# Patient Record
Sex: FEMALE | Race: BLACK OR AFRICAN AMERICAN | NOT HISPANIC OR LATINO | Employment: PART TIME | URBAN - METROPOLITAN AREA
[De-identification: names, ages, dates, MRNs, and addresses within clinical notes are randomized per-mention and may not be internally consistent; named-entity substitution may affect disease eponyms.]

---

## 2018-03-12 ENCOUNTER — OFFICE VISIT (OUTPATIENT)
Dept: OBSTETRICS AND GYNECOLOGY | Facility: CLINIC | Age: 23
End: 2018-03-12
Payer: COMMERCIAL

## 2018-03-12 VITALS
WEIGHT: 97.44 LBS | BODY MASS INDEX: 18.4 KG/M2 | HEIGHT: 61 IN | DIASTOLIC BLOOD PRESSURE: 62 MMHG | SYSTOLIC BLOOD PRESSURE: 98 MMHG

## 2018-03-12 DIAGNOSIS — N76.0 ACUTE VAGINITIS: Primary | ICD-10-CM

## 2018-03-12 LAB
CANDIDA RRNA VAG QL PROBE: NEGATIVE
G VAGINALIS RRNA GENITAL QL PROBE: POSITIVE
T VAGINALIS RRNA GENITAL QL PROBE: NEGATIVE

## 2018-03-12 PROCEDURE — 87491 CHLMYD TRACH DNA AMP PROBE: CPT

## 2018-03-12 PROCEDURE — 87480 CANDIDA DNA DIR PROBE: CPT

## 2018-03-12 PROCEDURE — 99999 PR PBB SHADOW E&M-NEW PATIENT-LVL III: CPT | Mod: PBBFAC,,, | Performed by: NURSE PRACTITIONER

## 2018-03-12 PROCEDURE — 99203 OFFICE O/P NEW LOW 30 MIN: CPT | Mod: SA,S$GLB,, | Performed by: NURSE PRACTITIONER

## 2018-03-12 RX ORDER — METRONIDAZOLE 7.5 MG/G
1 GEL VAGINAL DAILY
Qty: 70 G | Refills: 0 | Status: SHIPPED | OUTPATIENT
Start: 2018-03-12 | End: 2018-03-17

## 2018-03-12 NOTE — LETTER
March 12, 2018      [facility] [address]  [address], [address]  [facility] [address]  Phone: [phone]  Fax: [phone]       Patient: Jeri Galloway   YOB: 1995  Date of Visit: 03/12/2018    To Whom It May Concern:    Michele Galloway  was at [facility] on 03/12/2018. She may return to work/school on 3/13/2018 with no restrictions. If you have any questions or concerns, or if I can be of further assistance, please do not hesitate to contact me.    Sincerely,      KEREN Scott

## 2018-03-12 NOTE — PROGRESS NOTES
CC: Vaginal Discharge    Jeri Galloway is a 22 y.o. female  presents with complaint of vaginal discharge for 3 weeks.  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  She denies itching.  reports odor.  She states the discharge is clear.  Alleviating factors: none. Reports 1 new sexual partners.        ROS:  GENERAL: No fever, chills, fatigability or weight loss.  VULVAR: No pain, no lesions and no itching.  VAGINAL: No relaxation, no itching, + discharge, + odor. no abnormal bleeding and no lesions.  ABDOMEN: No abdominal pain. Denies nausea. Denies vomiting. No diarrhea. No constipation  BREAST: Denies pain. No lumps. No discharge.  URINARY: No incontinence, no nocturia, no frequency and no dysuria.  CARDIOVASCULAR: No chest pain. No shortness of breath. No leg cramps.  NEUROLOGICAL: No headaches. No vision changes.    PHYSICAL EXAM:  VULVA: normal appearing vulva with no masses, tenderness or lesions   VAGINA: normal appearing vagina with normal color and + thin discharge, no lesions   CERVIX: normal appearing cervix without discharge or lesions   UTERUS: uterus is normal size, shape, consistency and nontender   ADNEXA: normal adnexa in size, nontender and no masses    Diagnosis:  1. Acute vaginitis        Plan:   GCCT  Affirm  Metrogel    Orders Placed This Encounter    Vaginosis Screen by DNA Probe    C. trachomatis/N. gonorrhoeae by AMP DNA Cervix    metroNIDAZOLE (METROGEL VAGINAL) 0.75 % vaginal gel         Patient was counseled today on vaginitis prevention including :  a. avoiding feminine products such as deoderant soaps, body wash, bubble bath, douches, scented toilet paper, deoderant tampons or pads, feminine wipes, chronic pad use, etc.  b. avoiding other vulvovaginal irritants such as long hot baths, humidity, tight, synthetic clothing, chlorine and sitting around in wet bathing suits  c. wearing cotton underwear, avoiding thong underwear and no underwear to bed  d. taking showers instead of baths and use a  hair dryer on cool setting afterwards to dry  e. wearing cotton to exercise and shower immediately after exercise and change clothes  f. using polyurethane condoms without spermicide if sexually active and symptoms are triggered by intercourse    FOLLOW UP: PRN lack of improvement.    Kierra Rankin, FNP-C

## 2018-03-13 ENCOUNTER — TELEPHONE (OUTPATIENT)
Dept: OBSTETRICS AND GYNECOLOGY | Facility: CLINIC | Age: 23
End: 2018-03-13

## 2018-03-13 LAB
C TRACH DNA SPEC QL NAA+PROBE: NOT DETECTED
N GONORRHOEA DNA SPEC QL NAA+PROBE: NOT DETECTED

## 2018-03-13 NOTE — TELEPHONE ENCOUNTER
Called patient, no answer, could not leave message.         Please let patient know her vaginosis culture came back positive for bacterial vaginosis- which is not a STD. It is a bacteria that some times over grows in the vagina and replaces normal vaginal binu. Continue Metrogel as we discussed.Please notify pt her gonorrhea and chlamydia culture came back negative.

## 2018-04-10 ENCOUNTER — TELEPHONE (OUTPATIENT)
Dept: OBSTETRICS AND GYNECOLOGY | Facility: CLINIC | Age: 23
End: 2018-04-10

## 2018-04-10 NOTE — TELEPHONE ENCOUNTER
Returning patients call, no answer, left message to call office at [phone].          ----- Message from Sonali Carson sent at 4/10/2018  9:04 AM CDT -----  Contact: self  Pt returning a missed call from March, she can be reached at [phone].

## 2018-04-10 NOTE — TELEPHONE ENCOUNTER
Patient returning missed call form 3/13/2018.     Patient notified of results, verbalized understanding. Stated that she has taken the Rx back in March. Currently have no symptoms.               ----- Message from Sonali Carson sent at 4/10/2018 10:20 AM CDT -----  Contact: self  Pt returning a missed call, she can be reached at [phone].

## 2018-06-07 ENCOUNTER — OFFICE VISIT (OUTPATIENT)
Dept: OBSTETRICS AND GYNECOLOGY | Facility: CLINIC | Age: 23
End: 2018-06-07
Payer: COMMERCIAL

## 2018-06-07 VITALS
BODY MASS INDEX: 19.65 KG/M2 | HEIGHT: 61 IN | WEIGHT: 104.06 LBS | SYSTOLIC BLOOD PRESSURE: 100 MMHG | DIASTOLIC BLOOD PRESSURE: 60 MMHG

## 2018-06-07 DIAGNOSIS — R82.998 LEUKOCYTES IN URINE: ICD-10-CM

## 2018-06-07 DIAGNOSIS — R32 URINARY INCONTINENCE, UNSPECIFIED TYPE: ICD-10-CM

## 2018-06-07 DIAGNOSIS — Z72.51 UNPROTECTED SEXUAL INTERCOURSE: ICD-10-CM

## 2018-06-07 DIAGNOSIS — Z11.3 SCREEN FOR STD (SEXUALLY TRANSMITTED DISEASE): Primary | ICD-10-CM

## 2018-06-07 LAB
B-HCG UR QL: NEGATIVE
BILIRUB SERPL-MCNC: NEGATIVE MG/DL
BLOOD URINE, POC: NEGATIVE
CANDIDA RRNA VAG QL PROBE: NEGATIVE
COLOR, POC UA: YELLOW
CTP QC/QA: YES
G VAGINALIS RRNA GENITAL QL PROBE: POSITIVE
GLUCOSE UR QL STRIP: NORMAL
KETONES UR QL STRIP: NEGATIVE
LEUKOCYTE ESTERASE URINE, POC: ABNORMAL
NITRITE, POC UA: POSITIVE
PH, POC UA: 7
PROTEIN, POC: NEGATIVE
SPECIFIC GRAVITY, POC UA: 1
T VAGINALIS RRNA GENITAL QL PROBE: NEGATIVE
UROBILINOGEN, POC UA: NORMAL

## 2018-06-07 PROCEDURE — 87186 SC STD MICRODIL/AGAR DIL: CPT

## 2018-06-07 PROCEDURE — 87088 URINE BACTERIA CULTURE: CPT

## 2018-06-07 PROCEDURE — 87480 CANDIDA DNA DIR PROBE: CPT

## 2018-06-07 PROCEDURE — 87086 URINE CULTURE/COLONY COUNT: CPT

## 2018-06-07 PROCEDURE — 87510 GARDNER VAG DNA DIR PROBE: CPT

## 2018-06-07 PROCEDURE — 81002 URINALYSIS NONAUTO W/O SCOPE: CPT | Mod: S$GLB,,, | Performed by: NURSE PRACTITIONER

## 2018-06-07 PROCEDURE — 87491 CHLMYD TRACH DNA AMP PROBE: CPT

## 2018-06-07 PROCEDURE — 87077 CULTURE AEROBIC IDENTIFY: CPT

## 2018-06-07 PROCEDURE — 81025 URINE PREGNANCY TEST: CPT | Mod: S$GLB,,, | Performed by: NURSE PRACTITIONER

## 2018-06-07 PROCEDURE — 99213 OFFICE O/P EST LOW 20 MIN: CPT | Mod: 25,S$GLB,, | Performed by: NURSE PRACTITIONER

## 2018-06-07 PROCEDURE — 99999 PR PBB SHADOW E&M-EST. PATIENT-LVL III: CPT | Mod: PBBFAC,,,

## 2018-06-07 NOTE — PROGRESS NOTES
CC: possible UTI and STD check    HPI: Pt is a 23 y.o.  female who presents for what she thinks is a UTI. Reports a strong urine odor and random incontinence. States she gets a left sided cramp when she does not drink enough water. Does not think she is dehydrated today. Denies dysuria, hematuria, fever, chills, or urgency. Reports urinary frequency. She does have a 2 year old. Thinks the incontinence has been going on for about a month now. Random in nature. Not associated with coughing or sneezing. Does not hold her bladder for long periods of time.  She does want STD screening. She has been having unprotected intercourse x 3 weeks now. Requesting a UPT today. Thinks she may be allergic to latex condoms. Last few times she used them she had vaginal itching afterwards.       ROS:  GENERAL: Feeling well overall. Denies fever or chills.   SKIN: Denies rash or lesions.   HEAD: Denies head injury or headache.   NODES: Denies enlarged lymph nodes.   CHEST: Denies chest pain or shortness of breath.   CARDIOVASCULAR: Denies palpitations or left sided chest pain.   ABDOMEN: No abdominal pain, constipation, diarrhea, nausea, vomiting or rectal bleeding.   URINARY: No dysuria, hematuria, or burning on urination. Urinary incontinence and foul urine odor  REPRODUCTIVE: See HPI.   BREASTS: Denies pain, lumps, or nipple discharge.   HEMATOLOGIC: No easy bruisability or excessive bleeding.   MUSCULOSKELETAL: Denies joint pain or swelling.   NEUROLOGIC: Denies syncope or weakness.   PSYCHIATRIC: Denies depression, anxiety or mood swings.    PE:   APPEARANCE: Well nourished, well developed, Black or  female in no acute distress.  VULVA: No lesions. Normal external female genitalia.  URETHRAL MEATUS: Normal size and location, no lesions, no prolapse.  URETHRA: No masses, tenderness, or prolapse.  VAGINA: Moist. No lesions or lacerations noted. Large amount of thin milky discharge. No odor present.   CERVIX: No  lesions or discharge. No cervical motion tenderness.   UTERUS: Normal size, regular shape, mobile, non-tender.  ADNEXA: No tenderness. No fullness or masses palpated in the adnexal regions.   ANUS PERINEUM: Normal.      Diagnosis:  1. Screen for STD (sexually transmitted disease)    2. Unprotected sexual intercourse    3. Urinary incontinence, unspecified type    4. Leukocytes in urine        Plan:     Orders Placed This Encounter    C. trachomatis/N. gonorrhoeae by AMP DNA Cervix    Urine culture    Vaginosis Screen by DNA Probe    POCT URINE DIPSTICK WITHOUT MICROSCOPE     GCCT and affirm pending  Urology referral; discussed Kegel exercises  UPT negative-encouraged non latex condoms    Urine cx pending  -UTI precautions:  Wipe front to back and avoid constipation.  Avoid caffeine.  Drink lots of water  Void every 3-4 hrs.  Expel urine from vagina post void  No dryer sheets or harsh detergents with the undergarments  No bubble baths  Void before and after intercourse  Avoid hot tub use  Void soon after urge arises  Avoid tight fitting clothes and panty hose  Cranberry supplement      Follow-up with

## 2018-06-08 ENCOUNTER — TELEPHONE (OUTPATIENT)
Dept: OBSTETRICS AND GYNECOLOGY | Facility: CLINIC | Age: 23
End: 2018-06-08

## 2018-06-08 RX ORDER — METRONIDAZOLE 500 MG/1
500 TABLET ORAL EVERY 12 HOURS
Qty: 14 TABLET | Refills: 0 | Status: SHIPPED | OUTPATIENT
Start: 2018-06-08 | End: 2018-06-15

## 2018-06-08 NOTE — TELEPHONE ENCOUNTER
----- Message from Mimi Arnold NP sent at 6/8/2018 11:48 AM CDT -----  Regarding: BV rx to pharmacy  Please notify pt of BV-NOT an STD just an overgrowth of bacteria. Flagyl sent to pharmacy. Take for 7 days. No alcohol.

## 2018-06-08 NOTE — TELEPHONE ENCOUNTER
Informed pt of BV-NOT an STD just an overgrowth of bacteria. Flagyl sent to pharmacy. Take for 7 days. No alcohol. Pt verbalized understanding.

## 2018-06-09 LAB
C TRACH DNA SPEC QL NAA+PROBE: NOT DETECTED
N GONORRHOEA DNA SPEC QL NAA+PROBE: NOT DETECTED

## 2018-06-11 ENCOUNTER — OFFICE VISIT (OUTPATIENT)
Dept: UROLOGY | Facility: CLINIC | Age: 23
End: 2018-06-11
Attending: UROLOGY
Payer: COMMERCIAL

## 2018-06-11 VITALS
DIASTOLIC BLOOD PRESSURE: 65 MMHG | SYSTOLIC BLOOD PRESSURE: 98 MMHG | BODY MASS INDEX: 19.94 KG/M2 | HEART RATE: 68 BPM | WEIGHT: 105.63 LBS | HEIGHT: 61 IN

## 2018-06-11 DIAGNOSIS — R39.15 URGENCY OF URINATION: ICD-10-CM

## 2018-06-11 DIAGNOSIS — N30.00 ACUTE CYSTITIS WITHOUT HEMATURIA: ICD-10-CM

## 2018-06-11 DIAGNOSIS — R35.0 FREQUENCY OF URINATION: Primary | ICD-10-CM

## 2018-06-11 DIAGNOSIS — N39.41 URGE INCONTINENCE: ICD-10-CM

## 2018-06-11 LAB
BACTERIA UR CULT: NORMAL
BILIRUB SERPL-MCNC: ABNORMAL MG/DL
BLOOD URINE, POC: ABNORMAL
COLOR, POC UA: ABNORMAL
GLUCOSE UR QL STRIP: NORMAL
KETONES UR QL STRIP: ABNORMAL
LEUKOCYTE ESTERASE URINE, POC: ABNORMAL
NITRITE, POC UA: ABNORMAL
PH, POC UA: 5
PROTEIN, POC: ABNORMAL
SPECIFIC GRAVITY, POC UA: 1.01
UROBILINOGEN, POC UA: NORMAL

## 2018-06-11 PROCEDURE — 81002 URINALYSIS NONAUTO W/O SCOPE: CPT | Mod: S$GLB,,, | Performed by: UROLOGY

## 2018-06-11 PROCEDURE — 99243 OFF/OP CNSLTJ NEW/EST LOW 30: CPT | Mod: 25,S$GLB,, | Performed by: UROLOGY

## 2018-06-11 RX ORDER — NITROFURANTOIN 25; 75 MG/1; MG/1
100 CAPSULE ORAL 2 TIMES DAILY
Qty: 14 CAPSULE | Refills: 0 | Status: SHIPPED | OUTPATIENT
Start: 2018-06-11 | End: 2018-06-18

## 2018-06-11 NOTE — LETTER
June 11, 2018      Mimi Arnold NP  [address]  [address]  [facility] [address]           [facility]  [address]  [facility] [address]  Phone: [phone]  Fax: [phone]          Patient: Jeri Galloway   MR Number: [id number]   YOB: 1995   Date of Visit: 6/11/2018       Dear Mimi Arnold:    Thank you for referring Jeri Galloway to me for evaluation. Attached you will find relevant portions of my assessment and plan of care.    If you have questions, please do not hesitate to call me. I look forward to following Jeri Galloway along with you.    Sincerely,    Thomas Staples MD    Enclosure  CC:  No Recipients    If you would like to receive this communication electronically, please contact [email or web address] or [phone] to request more information on [facility] access.    For providers and/or their staff who would like to refer a patient to [facility], please contact us through our one-stop-shop provider referral line, [facility], at [phone].    If you feel you have received this communication in error or would no longer like to receive these types of communications, please e-mail [email or web address]

## 2018-06-11 NOTE — PROGRESS NOTES
"Subjective:      Jeri Galloway is a 23 y.o. female who was referred by Mimi Arnold NP for evaluation of irritative voiding symptoms.      She reports several months of intermittent symptoms including frequency, urgency, and urge incontinence. She reports several visits to urgent care and only diagnosed w/ BV (in March). She is not sure if she had UTI workup (none in record here).    She was seen in GYN walkin clinic last week. Tests again showed BV, which has been treated. She also had + urine culture, as of this AM, and antibiotics were prescribed.     The following portions of the patient's history were reviewed and updated as appropriate: allergies, current medications, past family history, past medical history, past social history, past surgical history and problem list.    Review of Systems  Constitutional: no fever or chills  ENT: no nasal congestion or sore throat  Respiratory: no cough or shortness of breath  Cardiovascular: no chest pain or palpitations  Gastrointestinal: no nausea or vomiting, tolerating diet  Genitourinary: as per HPI  Hematologic/Lymphatic: no easy bruising or lymphadenopathy  Musculoskeletal: no arthralgias or myalgias  Neurological: no seizures or tremors  Behavioral/Psych: no auditory or visual hallucinations     Objective:   Vital Signs:BP 98/65 (BP Location: Left arm, Patient Position: Sitting, BP Method: Medium (Automatic))   Pulse 68   Ht 5' 1" (1.549 m)   Wt 47.9 kg (105 lb 9.6 oz)   LMP 05/22/2018 (Approximate)   BMI 19.95 kg/m²     Physical Exam   General: alert and oriented, no acute distress  Head: normocephalic, atraumatic  Neck: supple, no lymphadenopathy, normal ROM, no masses  Respiratory: Symmetric expansion, non-labored breathing  Cardiovascular: regular rate and rhythm, nomal pulses, no peripheral edema  Skin: normal coloration and turgor, no rashes, no suspicious skin lesions noted  Neuro: alert and oriented x3, no gross deficits  Psych: normal " judgment and insight, normal mood/affect and non-anxious    Lab Review   Urinalysis demonstrates positive for nitrites, leukocytes    Urine culture reviewed in Epic    Assessment:     1. Frequency of urination    2. Urgency of urination    3. Urge incontinence    4. Acute cystitis without hematuria        Plan:   1. Symptoms all likely 2/2 UTI and should improve w/ abx  2. Discussed preventive measures including adequate hydration, cranberry juice, regular voiding, and voiding after intercourse.  3. Antibiotics as prescribed  4. FU PRN

## 2018-06-14 ENCOUNTER — TELEPHONE (OUTPATIENT)
Dept: UROLOGY | Facility: CLINIC | Age: 23
End: 2018-06-14

## 2018-06-14 ENCOUNTER — TELEPHONE (OUTPATIENT)
Dept: OBSTETRICS AND GYNECOLOGY | Facility: CLINIC | Age: 23
End: 2018-06-14

## 2018-06-14 NOTE — TELEPHONE ENCOUNTER
----- Message from Mell Ellington sent at 6/14/2018  3:08 PM CDT -----            Name of Who is Calling: selma      What is the request in detail: pt. Returning phone call.please call to discuss      Can the clinic reply by [facility]: no      What Number to Call Back if not in [facility]: [phone]

## 2018-06-14 NOTE — TELEPHONE ENCOUNTER
Left VM for pt to give the office a call.      Please notify pt of + UTI and antibiotic sent to her pharmacy today. She was also negative for gonorrhea and chlamydia.     -UTI precautions:   Wipe front to back and avoid constipation.   Avoid caffeine.   Drink lots of water   Void every 3-4 hrs.   Expel urine from vagina post void   No dryer sheets or harsh detergents with the undergarments   No bubble baths   Void before and after intercourse   Avoid hot tub use   Void soon after urge arises   Avoid tight fitting clothes and panty hose   Cranberry supplement

## 2018-06-14 NOTE — TELEPHONE ENCOUNTER
----- Message from Mimi Arnold NP sent at 6/11/2018  9:31 AM CDT -----  Regarding: UTI and rx to pharmacy  Please notify pt of + UTI and antibiotic sent to her pharmacy today. She was also negative for gonorrhea and chlamydia.     -UTI precautions:  Wipe front to back and avoid constipation.  Avoid caffeine.  Drink lots of water  Void every 3-4 hrs.  Expel urine from vagina post void  No dryer sheets or harsh detergents with the undergarments  No bubble baths  Void before and after intercourse  Avoid hot tub use  Void soon after urge arises  Avoid tight fitting clothes and panty hose  Cranberry supplement

## 2018-06-14 NOTE — TELEPHONE ENCOUNTER
Spoke with pt, informed her that it looks as if her GYN office was trying to contact her. Pt stated she will give that office a call.

## 2018-09-13 ENCOUNTER — OFFICE VISIT (OUTPATIENT)
Dept: OBSTETRICS AND GYNECOLOGY | Facility: CLINIC | Age: 23
End: 2018-09-13
Payer: COMMERCIAL

## 2018-09-13 VITALS
HEIGHT: 61 IN | WEIGHT: 104.75 LBS | DIASTOLIC BLOOD PRESSURE: 80 MMHG | SYSTOLIC BLOOD PRESSURE: 116 MMHG | BODY MASS INDEX: 19.78 KG/M2

## 2018-09-13 DIAGNOSIS — B37.9 CANDIDIASIS: ICD-10-CM

## 2018-09-13 DIAGNOSIS — Z30.013 ENCOUNTER FOR INITIAL PRESCRIPTION OF INJECTABLE CONTRACEPTIVE: Primary | ICD-10-CM

## 2018-09-13 DIAGNOSIS — R30.0 DYSURIA: ICD-10-CM

## 2018-09-13 LAB
B-HCG UR QL: NEGATIVE
BILIRUB SERPL-MCNC: ABNORMAL MG/DL
BLOOD URINE, POC: ABNORMAL
COLOR, POC UA: YELLOW
CTP QC/QA: YES
GLUCOSE UR QL STRIP: NORMAL
KETONES UR QL STRIP: ABNORMAL
LEUKOCYTE ESTERASE URINE, POC: ABNORMAL
NITRITE, POC UA: ABNORMAL
PH, POC UA: 5
PROTEIN, POC: ABNORMAL
SPECIFIC GRAVITY, POC UA: 1.01
UROBILINOGEN, POC UA: NORMAL

## 2018-09-13 PROCEDURE — 87077 CULTURE AEROBIC IDENTIFY: CPT

## 2018-09-13 PROCEDURE — 99214 OFFICE O/P EST MOD 30 MIN: CPT | Mod: 25,S$GLB,, | Performed by: OBSTETRICS & GYNECOLOGY

## 2018-09-13 PROCEDURE — 81025 URINE PREGNANCY TEST: CPT | Mod: S$GLB,,, | Performed by: OBSTETRICS & GYNECOLOGY

## 2018-09-13 PROCEDURE — 87086 URINE CULTURE/COLONY COUNT: CPT

## 2018-09-13 PROCEDURE — 81002 URINALYSIS NONAUTO W/O SCOPE: CPT | Mod: S$GLB,,, | Performed by: OBSTETRICS & GYNECOLOGY

## 2018-09-13 PROCEDURE — 87186 SC STD MICRODIL/AGAR DIL: CPT

## 2018-09-13 PROCEDURE — 99999 PR PBB SHADOW E&M-EST. PATIENT-LVL III: CPT | Mod: PBBFAC,,, | Performed by: OBSTETRICS & GYNECOLOGY

## 2018-09-13 PROCEDURE — 87088 URINE BACTERIA CULTURE: CPT

## 2018-09-13 RX ORDER — MEDROXYPROGESTERONE ACETATE 150 MG/ML
150 INJECTION, SUSPENSION INTRAMUSCULAR ONCE
Qty: 1 ML | Refills: 0 | Status: SHIPPED | OUTPATIENT
Start: 2018-09-13 | End: 2018-09-13

## 2018-09-13 RX ORDER — NITROFURANTOIN 25; 75 MG/1; MG/1
CAPSULE ORAL
Refills: 0 | COMMUNITY
Start: 2018-07-19 | End: 2018-09-13

## 2018-09-13 RX ORDER — MEDROXYPROGESTERONE ACETATE 150 MG/ML
150 INJECTION, SUSPENSION INTRAMUSCULAR
Qty: 1 ML | Refills: 0 | Status: SHIPPED | OUTPATIENT
Start: 2018-09-13 | End: 2018-11-12 | Stop reason: SDUPTHER

## 2018-09-13 RX ORDER — FLUCONAZOLE 150 MG/1
150 TABLET ORAL ONCE
Qty: 1 TABLET | Refills: 1 | Status: SHIPPED | OUTPATIENT
Start: 2018-09-13 | End: 2018-09-13

## 2018-09-13 NOTE — PROGRESS NOTES
History & Physical  Gynecology      SUBJECTIVE:     Chief Complaint: Vaginitis and Contraception       History of Present Illness:  Contraception Counseling  Patient presents for contraception counseling.  Pt reports that she has a latex allergy and have been using lambskin condoms for contraception.  However, notes that menses are heavy, and therefore would like to discuss starting something for contraception that will help with her menses.  Notes menses lasts 3-4 days.  Using both tampons and pads and often overflows both.  Reports passage of large clots.  Denies any lightheadedness/dizziness. Pt was on OCPs in high school but thinks that she wont remember to take a pill.  Has taken depo in the past and would like to try it again.  Got pregnant a month after starting depo.  Discussed with patient that needs to have another form of birth control for at least the first month after starting depo.  The patient is sexually active.  Pt also reports that she has a yeast infection.  Reports white vaginal discharge.  Does not want pelvic exam because she is on her period. Pertinent past medical history: none.  Pt recently with UTI treated with macrobid.  Reports that symptoms resolved.  Had hematuria and dysuria at that time.  Reports having some side pain today.  Denies fever/chills.        Review of patient's allergies indicates:   Allergen Reactions    Latex, natural rubber Hives       Past Medical History:   Diagnosis Date    Urinary tract infection      History reviewed. No pertinent surgical history.  OB History      Para Term  AB Living    1         1    SAB TAB Ectopic Multiple Live Births                     Family History   Problem Relation Age of Onset    Hypertension Father     Hypertension Mother      Social History     Tobacco Use    Smoking status: Never Smoker    Smokeless tobacco: Never Used   Substance Use Topics    Alcohol use: Yes     Comment: Occasionally    Drug use: No        Current Outpatient Medications   Medication Sig    fluconazole (DIFLUCAN) 150 MG Tab Take 1 tablet (150 mg total) by mouth once. REFILL AND RE-DOSE IF SYMPTOMS RECUR for 1 dose    medroxyPROGESTERone (DEPO-PROVERA) 150 mg/mL Syrg Inject 1 mL (150 mg total) into the muscle every 3 (three) months.     No current facility-administered medications for this visit.          Review of Systems:  Review of Systems   Constitutional: Negative for activity change, appetite change, chills, fatigue, fever and unexpected weight change.   Respiratory: Negative for cough, shortness of breath and wheezing.    Cardiovascular: Negative for chest pain and leg swelling.   Gastrointestinal: Negative for abdominal pain, constipation, diarrhea, nausea and vomiting.   Endocrine: Negative for hair loss and hot flashes.   Genitourinary: Positive for menorrhagia, menstrual problem, vaginal bleeding, vaginal discharge and dysmenorrhea. Negative for decreased libido, dyspareunia, dysuria, frequency, pelvic pain, urgency and vaginal pain.   Skin:  Negative for no acne and hair changes.   Neurological: Negative for headaches.   Psychiatric/Behavioral: Negative for sleep disturbance.   Breast: Negative for breast pain, nipple discharge and skin changes       OBJECTIVE:     Physical Exam:  Physical Exam   Constitutional: She is oriented to person, place, and time. She appears well-developed and well-nourished.   HENT:   Head: Normocephalic and atraumatic.   Eyes: Conjunctivae are normal. Right eye exhibits no discharge. Left eye exhibits no discharge. No scleral icterus.   Pulmonary/Chest: Effort normal. No stridor.   Abdominal: Soft. She exhibits no distension. There is no tenderness.   Genitourinary:   Genitourinary Comments: Pt declined pelvic exam.  No CVAT bilaterally.  No suprapubic TTP   Musculoskeletal: Normal range of motion.   Neurological: She is alert and oriented to person, place, and time.   Skin: Skin is warm and dry.    Psychiatric: She has a normal mood and affect. Her behavior is normal. Judgment and thought content normal.         ASSESSMENT:       ICD-10-CM ICD-9-CM    1. Encounter for initial prescription of injectable contraceptive Z30.013 V25.02 POCT urine pregnancy      medroxyPROGESTERone (DEPO-PROVERA) 150 mg/mL Syrg      DISCONTINUED: medroxyPROGESTERone (DEPO-PROVERA) 150 mg/mL Syrg   2. Candidiasis B37.9 112.9    3. Dysuria R30.0 788.1 POCT URINE DIPSTICK WITHOUT MICROSCOPE      Urine culture          Plan:      Jeri was seen today for vaginitis and contraception.    Diagnoses and all orders for this visit:    Encounter for initial prescription of injectable contraceptive  -All forms of contraception discussed in length with patient.  Discussed the pros and cons of OCPs, depo provera, IUD, nexplanon, the patch and NuvaRing.  Discussed that pt might have irregular menses with the initiation of any form of contraception.  Discussed that none of these protect against STDs.  Depending on which contraception pt chooses, discussed need for backup form of birth control for at least the first month after switching.  Pt voiced understanding and all questions were answered.  After our discussion, the patient has decided to try Depo provera  - First shot today.    -     POCT urine pregnancy- NEGATIVE  -     medroxyPROGESTERone (DEPO-PROVERA) 150 mg/mL Syrg; Inject 1 mL (150 mg total) into the muscle every 3 (three) months.    Candidiasis   - Vaginal discharge per patient.  Declined pelvic exam because on menses. Will give Rx for diflucan.  If no relief, pt needs to come back to clinic and have vaginal swab done.      -     fluconazole (DIFLUCAN) 150 MG Tab; Take 1 tablet (150 mg total) by mouth once. REFILL AND RE-DOSE IF SYMPTOMS RECUR for 1 dose    Dysuria  - Reports side pain.  Recent UTI treated with macrobid  - Udip today + leukocytes and protein.  UCx sent  -     POCT URINE DIPSTICK WITHOUT MICROSCOPE  -     Urine  culture        Orders Placed This Encounter   Procedures    Urine culture    POCT URINE DIPSTICK WITHOUT MICROSCOPE    POCT urine pregnancy       Follow-up in about 2 months (around 11/13/2018) for annual.     Counseling time: 30 minutes    Yolie Topete

## 2018-09-13 NOTE — LETTER
September 13, 2018    Jeri Galloway  [address] [place] [address]         [facility] [address]  [address] [address]  Phone: [phone] September 13, 2018     Patient: Jeri Galloway   YOB: 1995   Date of Visit: 9/13/2018       To Whom It May Concern:    It is my medical opinion that Jeri Galloway may return to work on 9-.    If you have any questions or concerns, please don't hesitate to call.    Sincerely,        Dr. Yolie Topete

## 2018-09-17 LAB — BACTERIA UR CULT: NORMAL

## 2018-09-18 ENCOUNTER — TELEPHONE (OUTPATIENT)
Dept: OBSTETRICS AND GYNECOLOGY | Facility: CLINIC | Age: 23
End: 2018-09-18

## 2018-09-18 RX ORDER — CIPROFLOXACIN 250 MG/1
250 TABLET, FILM COATED ORAL EVERY 12 HOURS
Qty: 6 TABLET | Refills: 0 | Status: SHIPPED | OUTPATIENT
Start: 2018-09-18 | End: 2018-09-21

## 2018-09-18 NOTE — TELEPHONE ENCOUNTER
----- Message from Yolie Topete MD sent at 9/18/2018  5:06 PM CDT -----  Please call patient and let her know she still has a UTI.  I'm going to send in an Rx for cipro to her pharmacy.  Have her pick this up.  Thanks!

## 2018-09-19 ENCOUNTER — OFFICE VISIT (OUTPATIENT)
Dept: PRIMARY CARE CLINIC | Facility: CLINIC | Age: 23
End: 2018-09-19
Payer: COMMERCIAL

## 2018-09-19 ENCOUNTER — NURSE TRIAGE (OUTPATIENT)
Dept: ADMINISTRATIVE | Facility: CLINIC | Age: 23
End: 2018-09-19

## 2018-09-19 ENCOUNTER — TELEPHONE (OUTPATIENT)
Dept: OBSTETRICS AND GYNECOLOGY | Facility: CLINIC | Age: 23
End: 2018-09-19

## 2018-09-19 VITALS
TEMPERATURE: 99 F | DIASTOLIC BLOOD PRESSURE: 64 MMHG | BODY MASS INDEX: 19.78 KG/M2 | SYSTOLIC BLOOD PRESSURE: 90 MMHG | HEIGHT: 61 IN | HEART RATE: 91 BPM | OXYGEN SATURATION: 99 % | WEIGHT: 104.75 LBS

## 2018-09-19 DIAGNOSIS — J02.9 SORE THROAT: Primary | ICD-10-CM

## 2018-09-19 DIAGNOSIS — J06.9 UPPER RESPIRATORY TRACT INFECTION, UNSPECIFIED TYPE: ICD-10-CM

## 2018-09-19 DIAGNOSIS — R05.9 COUGH: ICD-10-CM

## 2018-09-19 PROCEDURE — 99203 OFFICE O/P NEW LOW 30 MIN: CPT | Mod: S$GLB,,, | Performed by: NURSE PRACTITIONER

## 2018-09-19 PROCEDURE — 99999 PR PBB SHADOW E&M-EST. PATIENT-LVL IV: CPT | Mod: PBBFAC,,, | Performed by: NURSE PRACTITIONER

## 2018-09-19 RX ORDER — BENZONATATE 100 MG/1
100 CAPSULE ORAL 3 TIMES DAILY PRN
Qty: 30 CAPSULE | Refills: 0 | Status: SHIPPED | OUTPATIENT
Start: 2018-09-19 | End: 2018-09-29

## 2018-09-19 NOTE — PATIENT INSTRUCTIONS
1)Distilled salt water sinus rinses via neti pots or products such as Ayaz Med Sinus Rinse or Sinugator. Must wash container or device and use bottled water or distilled water. to avoid introducing infection.  2)Nasal Steroids (Nasocort, Rhinocort, Flonase)    - Can take Mucinex for cough  - Strongly encouraged adequate hydration and rest.  - Can take acetaminophen or ibuprofen as needed and as directed on bottle for any pain.   - Saline gargle and OTC throat lozenges as needed for sore throat.  - Avoid smoking and alcohol until symptoms improve.  - Hand washing for prevention.      - Return if symptoms worsen or do not improve in 1 week.

## 2018-09-19 NOTE — PROGRESS NOTES
Subjective:       Patient ID: Jeri Galloway is a 23 y.o. female with no significant PMH here for cold symptoms.      Chief Complaint: Establish Care; Cough (bloody spit, yellow mucus ); and Headache        HPI     Pt c/o 2 days of nasal congestion with pale yellow rhinorrhea. Cough is productive of blood tinged and pale yellow sputum. Very sore throat and HA. Denies fever/chills. Only taking cough drops. 2 year old son is sick with cold symptoms.   She has no appetite and has not eaten yet today. Also with mild diarrhea at onset of symptoms. Denies SOB/wheezing.    Review of Systems   Constitutional: Positive for appetite change (decreased) and fatigue. Negative for activity change, chills, diaphoresis and fever.   HENT: Positive for congestion, rhinorrhea and sore throat. Negative for ear pain, postnasal drip, sinus pressure, sinus pain and sneezing.    Eyes: Negative for visual disturbance.   Respiratory: Positive for cough. Negative for chest tightness, shortness of breath and wheezing.    Cardiovascular: Negative for chest pain.   Gastrointestinal: Positive for diarrhea. Negative for abdominal pain, constipation, nausea and vomiting.   Musculoskeletal: Negative for arthralgias and myalgias.   Neurological: Positive for headaches.   Hematological: Negative for adenopathy.       Past Medical History:   Diagnosis Date    Urinary tract infection      History reviewed. No pertinent surgical history.  Family History   Problem Relation Age of Onset    Hypertension Father     Hypertension Mother      Social History     Socioeconomic History    Marital status: Single     Spouse name: Not on file    Number of children: Not on file    Years of education: Not on file    Highest education level: Not on file   Social Needs    Financial resource strain: Not on file    Food insecurity - worry: Not on file    Food insecurity - inability: Not on file    Transportation needs - medical: Not on file     "Transportation needs - non-medical: Not on file   Occupational History    Not on file   Tobacco Use    Smoking status: Never Smoker    Smokeless tobacco: Never Used   Substance and Sexual Activity    Alcohol use: Yes     Comment: Occasionally    Drug use: No    Sexual activity: Yes     Partners: Male     Birth control/protection: None   Other Topics Concern    Not on file   Social History Narrative    Not on file         Objective:      Vitals:    09/19/18 1435   BP: 90/64   BP Location: Left arm   Patient Position: Sitting   Pulse: 91   Temp: 99.3 °F (37.4 °C)   SpO2: 99%   Weight: 47.5 kg (104 lb 11.5 oz)   Height: 5' 1" (1.549 m)      Physical Exam   Constitutional: She is oriented to person, place, and time. She appears well-developed and well-nourished. No distress.   HENT:   Head: Normocephalic and atraumatic.   Right Ear: Hearing, tympanic membrane, external ear and ear canal normal.   Left Ear: Hearing, tympanic membrane, external ear and ear canal normal.   Nose: Mucosal edema present.   Mouth/Throat: Posterior oropharyngeal erythema present. No oropharyngeal exudate.   Eyes: Conjunctivae and EOM are normal. Pupils are equal, round, and reactive to light.   Neck: Normal range of motion. Neck supple. No tracheal deviation present. No thyromegaly present.   Cardiovascular: Normal rate, regular rhythm, normal heart sounds and intact distal pulses. Exam reveals no gallop and no friction rub.   No murmur heard.  Pulmonary/Chest: Effort normal and breath sounds normal. No stridor. No respiratory distress. She has no wheezes. She has no rales. She exhibits no tenderness.   Abdominal: Soft. Bowel sounds are normal. She exhibits no distension and no mass. There is no tenderness. There is no guarding. No hernia.   Musculoskeletal: Normal range of motion. She exhibits no edema.   Lymphadenopathy:     She has no cervical adenopathy.   Neurological: She is alert and oriented to person, place, and time.   Skin: " Skin is warm and dry. She is not diaphoretic. No erythema.   Psychiatric: She has a normal mood and affect. Her behavior is normal. Thought content normal.   Vitals reviewed.      Assessment:       1. Sore throat    2. Upper respiratory tract infection, unspecified type    3. Cough        Plan:       Jeri was seen today for establish care, cough and headache.    Diagnoses and all orders for this visit:    Sore throat  -     POCT Rapid Strep A - neg    Upper respiratory tract infection, unspecified type-likely viral. Advised distilled saline nasal rinses followed by Flonase daily. Can try Mucinex as well and will do benzonatate for cough.  - Strongly encouraged adequate hydration and rest.  - Can take acetaminophen or ibuprofen as needed and as directed on bottle for any pain or headaches.  - Saline gargle and OTC throat lozenges as needed for sore throat.  - Avoid smoking and alcohol until symptoms improve.  - Hand washing for prevention.      - Return if symptoms worsen or do not improve in 1 week.          Cough    Other orders  -     benzonatate (TESSALON) 100 MG capsule; Take 1 capsule (100 mg total) by mouth 3 (three) times daily as needed for Cough.      RTC if symptoms worsen or fail to improve over the next several days, otherwise will return when well for annual physical exam.

## 2018-09-20 ENCOUNTER — TELEPHONE (OUTPATIENT)
Dept: OBSTETRICS AND GYNECOLOGY | Facility: CLINIC | Age: 23
End: 2018-09-20

## 2018-09-20 NOTE — TELEPHONE ENCOUNTER
----- Message from Trinidad Kearney MA sent at 9/19/2018  8:53 AM CDT -----  Please call patient and let her know she still has a UTI.  I'm going to send in an Rx for cipro to her pharmacy.  Have her pick this up.  Thanks!

## 2018-10-17 ENCOUNTER — OFFICE VISIT (OUTPATIENT)
Dept: OBSTETRICS AND GYNECOLOGY | Facility: CLINIC | Age: 23
End: 2018-10-17
Payer: COMMERCIAL

## 2018-10-17 VITALS
WEIGHT: 102.31 LBS | HEIGHT: 61 IN | SYSTOLIC BLOOD PRESSURE: 110 MMHG | BODY MASS INDEX: 19.32 KG/M2 | DIASTOLIC BLOOD PRESSURE: 60 MMHG

## 2018-10-17 DIAGNOSIS — B37.31 CANDIDA VAGINITIS: ICD-10-CM

## 2018-10-17 DIAGNOSIS — Z11.3 SCREEN FOR STD (SEXUALLY TRANSMITTED DISEASE): Primary | ICD-10-CM

## 2018-10-17 PROCEDURE — 99212 OFFICE O/P EST SF 10 MIN: CPT | Mod: S$GLB,,, | Performed by: OBSTETRICS & GYNECOLOGY

## 2018-10-17 PROCEDURE — 87491 CHLMYD TRACH DNA AMP PROBE: CPT

## 2018-10-17 PROCEDURE — 99999 PR PBB SHADOW E&M-EST. PATIENT-LVL III: CPT | Mod: PBBFAC,,, | Performed by: OBSTETRICS & GYNECOLOGY

## 2018-10-17 PROCEDURE — 87210 SMEAR WET MOUNT SALINE/INK: CPT | Mod: QW,S$GLB,, | Performed by: OBSTETRICS & GYNECOLOGY

## 2018-10-17 RX ORDER — FLUCONAZOLE 150 MG/1
150 TABLET ORAL DAILY
Qty: 1 TABLET | Refills: 0 | Status: SHIPPED | OUTPATIENT
Start: 2018-10-17 | End: 2018-10-18

## 2018-10-17 NOTE — PROGRESS NOTES
Subjective:       Patient ID: Jeri Galloway is a 23 y.o. female.    Chief Complaint:  Gynecologic Exam      History of Present Illness  HPI  Vaginal discharge with no itch or odor  History of vaginitis  Would like cervical DNA screen     Health Maintenance   Topic Date Due    Lipid Panel  1996    HPV Vaccines (1 - Female 3-dose series) 2007    TETANUS VACCINE  2014    Pap Smear  2017    Influenza Vaccine  2018    CHLAMYDIA SCREENING  2019     GYN & OB History  Patient's last menstrual period was 10/10/2018.   Date of Last Pap: No result found    OB History    Para Term  AB Living   1         1   SAB TAB Ectopic Multiple Live Births                  # Outcome Date GA Lbr Francois/2nd Weight Sex Delivery Anes PTL Lv   1                    Review of Systems  Review of Systems        Objective:   Physical Exam:               Genitourinary: There is no tenderness or lesion on the right labia. There is no rash, tenderness or lesion on the left labia. No bleeding in the vagina. No foreign body in the vagina. Vaginal discharge found. Labial bartholins normal.                   WET PREP;  Positive for budding yeast.  No lactobacilli noted, epith. Cells normal , no trich   Assessment:        1. Screen for STD (sexually transmitted disease)    2. Candida vaginitis                Plan:            Jeri was seen today for gynecologic exam.    Diagnoses and all orders for this visit:    Screen for STD (sexually transmitted disease)  -     C. trachomatis/N. gonorrhoeae by AMP DNA    Candida vaginitis  -     fluconazole (DIFLUCAN) 150 MG Tab; Take 1 tablet (150 mg total) by mouth once daily. for 1 day    Probiotic for 6 months

## 2018-10-18 LAB
C TRACH DNA SPEC QL NAA+PROBE: NOT DETECTED
N GONORRHOEA DNA SPEC QL NAA+PROBE: NOT DETECTED

## 2018-10-22 ENCOUNTER — TELEPHONE (OUTPATIENT)
Dept: OBSTETRICS AND GYNECOLOGY | Facility: CLINIC | Age: 23
End: 2018-10-22

## 2018-10-22 NOTE — TELEPHONE ENCOUNTER
----- Message from Junie Colladoin sent at 10/22/2018 11:13 AM CDT -----  Contact: Patient herself      Can the clinic reply in [facility]:  No      Please refill the medication(s) listed below. Please call the patient when the prescription(s) is ready for  at this phone number    Patient herself / # [phone]        Medication #1 fluconazole (DIFLUCAN) 150 MG Tab          Preferred Pharmacy: [facility] [address] [place] [address] [address] AT [facility] [address]      [phone] (Phone)  [phone] (Fax)

## 2018-10-24 ENCOUNTER — OFFICE VISIT (OUTPATIENT)
Dept: OBSTETRICS AND GYNECOLOGY | Facility: CLINIC | Age: 23
End: 2018-10-24
Payer: COMMERCIAL

## 2018-10-24 VITALS — WEIGHT: 102.06 LBS | HEIGHT: 61 IN | BODY MASS INDEX: 19.27 KG/M2

## 2018-10-24 DIAGNOSIS — N76.0 ACUTE VAGINITIS: Primary | ICD-10-CM

## 2018-10-24 PROCEDURE — 99213 OFFICE O/P EST LOW 20 MIN: CPT | Mod: S$GLB,,, | Performed by: NURSE PRACTITIONER

## 2018-10-24 PROCEDURE — 99999 PR PBB SHADOW E&M-EST. PATIENT-LVL III: CPT | Mod: PBBFAC,,, | Performed by: NURSE PRACTITIONER

## 2018-10-24 PROCEDURE — 87660 TRICHOMONAS VAGIN DIR PROBE: CPT

## 2018-10-24 NOTE — LETTER
October 24, 2018      [facility] [address]  [address]  [place] [facility] [address]  Phone: [phone]  Fax: [phone]       Patient: Jeri Galloway   YOB: 1995  Date of Visit: 10/24/2018    To Whom It May Concern:    Michele Galloway  was at [facility] on 10/24/2018. She may return to work/school on 10/25/2018 with no restrictions. If you have any questions or concerns, or if I can be of further assistance, please do not hesitate to contact me.    Sincerely,    Mimi Arnold NP.

## 2018-10-24 NOTE — PROGRESS NOTES
"Jeri Galloway is a 23 y.o. female  presents with complaint of vaginal discharge and odor. Hx of BV. Seen last week by Dr. Davenport, GCCT was negative at that visit. She is sexually active with one partner only. Partner is not circumcised. She is questioning if that is contributing to the problem. Uses unscented soap. Does not understand why she keeps getting this. Last documented affirm that was + for BV was in  with me.       Past Medical History:   Diagnosis Date    Urinary tract infection      No past surgical history on file.  Social History     Tobacco Use    Smoking status: Never Smoker    Smokeless tobacco: Never Used   Substance Use Topics    Alcohol use: Yes     Comment: Occasionally    Drug use: No     Family History   Problem Relation Age of Onset    Hypertension Father     Hypertension Mother      OB History    Para Term  AB Living   1         1   SAB TAB Ectopic Multiple Live Births                  # Outcome Date GA Lbr Francois/2nd Weight Sex Delivery Anes PTL Lv   1                    Ht 5' 1" (1.549 m)   Wt 46.3 kg (102 lb 1.2 oz)   LMP 10/10/2018 Comment: irregular cycles  BMI 19.29 kg/m²     ROS:  GENERAL: No fever, chills, fatigability or weight loss.  VULVAR: No pain, no lesions and no itching.  VAGINAL: No relaxation, no itching, + discharge, no abnormal bleeding and no lesions.  ABDOMEN: No abdominal pain. Denies nausea. Denies vomiting. No diarrhea. No constipation  BREAST: Denies pain. No lumps. No discharge.  URINARY: No incontinence, no nocturia, no frequency and no dysuria.  CARDIOVASCULAR: No chest pain. No shortness of breath. No leg cramps.  NEUROLOGICAL: No headaches. No vision changes.    PHYSICAL EXAM:  VULVA: normal appearing vulva with no masses, tenderness or lesions   VAGINA: normal appearing vagina with normal color. Large amount of thin white/grey discharge, appears to be BV, no lesions   CERVIX: normal appearing cervix without " discharge or lesions   UTERUS: uterus is normal size, shape, consistency and nontender   ADNEXA: normal adnexa in size, nontender and no masses    ASSESSMENT and PLAN:    ICD-10-CM ICD-9-CM    1. Acute vaginitis N76.0 616.10 Vaginosis Screen by DNA Probe     1. Affirm pending  2. D/w pt options of daily probiotics, using condoms, having partner clean prior to intercourse, daily lemon water, and boric acid suppositories.     Patient was counseled today on vaginitis prevention including :  a. avoiding feminine products such as deoderant soaps, body wash, bubble bath, douches, scented toilet paper, deoderant tampons or pads, feminine wipes, chronic pad use, etc.  b. avoiding other vulvovaginal irritants such as long hot baths, humidity, tight, synthetic clothing, chlorine and sitting around in wet bathing suits  c. wearing cotton underwear, avoiding thong underwear and no underwear to bed  d. taking showers instead of baths and use a hair dryer on cool setting afterwards to dry  e. wearing cotton to exercise and shower immediately after exercise and change clothes  f. using polyurethane condoms without spermicide if sexually active and symptoms are triggered by intercourse    FOLLOW UP: PRN lack of improvement.

## 2018-10-25 LAB
CANDIDA RRNA VAG QL PROBE: POSITIVE
G VAGINALIS RRNA GENITAL QL PROBE: POSITIVE
T VAGINALIS RRNA GENITAL QL PROBE: NEGATIVE

## 2018-10-26 ENCOUNTER — TELEPHONE (OUTPATIENT)
Dept: OBSTETRICS AND GYNECOLOGY | Facility: CLINIC | Age: 23
End: 2018-10-26

## 2018-10-26 RX ORDER — FLUCONAZOLE 200 MG/1
200 TABLET ORAL
Qty: 2 TABLET | Refills: 0 | Status: ON HOLD | OUTPATIENT
Start: 2018-10-26 | End: 2019-01-10 | Stop reason: HOSPADM

## 2018-10-26 RX ORDER — METRONIDAZOLE 500 MG/1
500 TABLET ORAL EVERY 12 HOURS
Qty: 14 TABLET | Refills: 0 | Status: SHIPPED | OUTPATIENT
Start: 2018-10-26 | End: 2018-11-02

## 2018-10-26 NOTE — TELEPHONE ENCOUNTER
Spoke with pt regarding affirm results. Rx diflucan and Flagyl to pharmacy. Pt will also try ph-D boric acid suppositories.

## 2018-11-12 ENCOUNTER — OFFICE VISIT (OUTPATIENT)
Dept: FAMILY MEDICINE | Facility: CLINIC | Age: 23
End: 2018-11-12
Payer: COMMERCIAL

## 2018-11-12 VITALS
WEIGHT: 102.31 LBS | SYSTOLIC BLOOD PRESSURE: 102 MMHG | HEART RATE: 98 BPM | HEIGHT: 61 IN | DIASTOLIC BLOOD PRESSURE: 78 MMHG | TEMPERATURE: 98 F | BODY MASS INDEX: 19.32 KG/M2 | OXYGEN SATURATION: 98 %

## 2018-11-12 DIAGNOSIS — R21 RASH AND NONSPECIFIC SKIN ERUPTION: Primary | ICD-10-CM

## 2018-11-12 DIAGNOSIS — Z30.013 ENCOUNTER FOR INITIAL PRESCRIPTION OF INJECTABLE CONTRACEPTIVE: ICD-10-CM

## 2018-11-12 PROCEDURE — 99212 OFFICE O/P EST SF 10 MIN: CPT | Mod: S$GLB,,, | Performed by: NURSE PRACTITIONER

## 2018-11-12 PROCEDURE — 99999 PR PBB SHADOW E&M-EST. PATIENT-LVL IV: CPT | Mod: PBBFAC,,, | Performed by: NURSE PRACTITIONER

## 2018-11-12 RX ORDER — CLOTRIMAZOLE AND BETAMETHASONE DIPROPIONATE 10; .64 MG/G; MG/G
CREAM TOPICAL 2 TIMES DAILY
Qty: 60 G | Refills: 0 | Status: ON HOLD | OUTPATIENT
Start: 2018-11-12 | End: 2019-01-10 | Stop reason: HOSPADM

## 2018-11-12 RX ORDER — MEDROXYPROGESTERONE ACETATE 150 MG/ML
150 INJECTION, SUSPENSION INTRAMUSCULAR
Qty: 1 ML | Refills: 4 | Status: SHIPPED | OUTPATIENT
Start: 2018-11-12 | End: 2019-05-27

## 2018-11-12 NOTE — PROGRESS NOTES
Subjective:       Patient ID: Jeri Galloway is a 23 y.o. female.    Chief Complaint: Rash (on bottom of foot bilaterally)    22yo female with no past medical history who presents to after hours clinic with complaints of rash to bilateral feet. Ms. Galloway reports a slight rash that has been present for some time but was not severe or concerning. She reports over the past few weeks the rash to her feet has worsened. The rash will hurt at times and itch at other times. She denies any sore throat, mouth sores or decreased appetite. 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 She has not applied anything topical or taken anything OTC. She does not sweat a lot and will wear open toed shoes on some days and closed toe shoes on other days. She does routinely get pedicures typically about once per month in attempts to improve the rash to no avail        Review of Systems   Skin: Positive for rash.       Objective:      Physical Exam   Constitutional: She is oriented to person, place, and time. She appears well-developed and well-nourished. No distress.   Neurological: She is alert and oriented to person, place, and time.   Skin: Skin is warm and dry.   Macules noted to bilateral plantar aspects of feet bilaterally; no erythema or drainage noted                    Assessment:       1. Rash and nonspecific skin eruption        Plan:       Rash noted to bilateral plantar aspects of feet  Concerned about combination of dermatitis vs fungal etiology  Will prescribe Lotrisone BID for now  Avoid soaking or pedicures for now  If symptoms persist and do not improve then will refer to Dermatology  Currently has no PCP. Instructed to call [facility] to establish care if rash persists

## 2018-11-28 ENCOUNTER — OFFICE VISIT (OUTPATIENT)
Dept: INTERNAL MEDICINE | Facility: CLINIC | Age: 23
End: 2018-11-28
Payer: COMMERCIAL

## 2018-11-28 VITALS
TEMPERATURE: 99 F | SYSTOLIC BLOOD PRESSURE: 90 MMHG | HEART RATE: 80 BPM | DIASTOLIC BLOOD PRESSURE: 60 MMHG | WEIGHT: 99.44 LBS | HEIGHT: 61 IN | BODY MASS INDEX: 18.78 KG/M2
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
## 2018-11-28 DIAGNOSIS — M54.2 NECK PAIN: ICD-10-CM

## 2018-11-28 DIAGNOSIS — R56.1 SEIZURE AFTER HEAD INJURY: ICD-10-CM

## 2018-11-28 PROCEDURE — 99999 PR PBB SHADOW E&M-EST. PATIENT-LVL IV: CPT | Mod: PBBFAC,,, | Performed by: INTERNAL MEDICINE

## 2018-11-28 PROCEDURE — 99214 OFFICE O/P EST MOD 30 MIN: CPT | Mod: S$GLB,,, | Performed by: INTERNAL MEDICINE

## 2018-11-28 RX ORDER — IBUPROFEN 600 MG/1
TABLET ORAL
Refills: 0 | COMMUNITY
Start: 2018-11-27 | End: 2018-11-28 | Stop reason: ALTCHOICE

## 2018-11-28 RX ORDER — METHOCARBAMOL 500 MG/1
500 TABLET, FILM COATED ORAL NIGHTLY PRN
Qty: 30 TABLET | Refills: 0 | Status: SHIPPED | OUTPATIENT
Start: 2018-11-28 | End: 2018-12-08

## 2018-11-28 RX ORDER — CYCLOBENZAPRINE HCL 10 MG
TABLET ORAL
Refills: 0 | COMMUNITY
Start: 2018-11-27 | End: 2018-11-28 | Stop reason: ALTCHOICE

## 2018-11-28 RX ORDER — DICLOFENAC SODIUM 50 MG/1
50 TABLET, DELAYED RELEASE ORAL 2 TIMES DAILY PRN
Qty: 30 TABLET | Refills: 0 | Status: ON HOLD | OUTPATIENT
Start: 2018-11-28 | End: 2019-01-10 | Stop reason: HOSPADM
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
## 2018-11-29 ENCOUNTER — TELEPHONE (OUTPATIENT)
Dept: NEUROLOGY | Facility: CLINIC | Age: 23
End: 2018-11-29

## 2018-11-29 ENCOUNTER — TELEPHONE (OUTPATIENT)
Dept: INTERNAL MEDICINE | Facility: CLINIC | Age: 23
End: 2018-11-29

## 2018-11-29 ENCOUNTER — PATIENT MESSAGE (OUTPATIENT)
Dept: INTERNAL MEDICINE | Facility: CLINIC | Age: 23
End: 2018-11-29

## 2018-11-29 NOTE — TELEPHONE ENCOUNTER
I called patient in regards to her appointment with Dr. Perales, there was no answer and I left a message.

## 2018-11-29 NOTE — TELEPHONE ENCOUNTER
Spoke with pt; states she missed her appt because she went to the wrong location. Advised pt neurology is booking up quickly, next availability is in mid December (per referral coordinator). Advised pt to contact insurance company for in network neurologists. Pt also request extension of doctors excuse; extension has been honored, return to work school on 12/3/2018; pt notified via portal

## 2018-11-29 NOTE — TELEPHONE ENCOUNTER
Sent letter to pt via portal letter of excused absence has been printed and filed on the 6th floor in the event she was not able to print it from the portal .

## 2018-11-29 NOTE — TELEPHONE ENCOUNTER
----- Message from Lorna Mei sent at 11/29/2018  1:37 PM CST -----  Hello, pt missed her appointment today. She is asking if she can be rescheduled to a sooner date then 12/17. Would this be possible?    Thanks!    Lorna
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
## 2018-11-29 NOTE — TELEPHONE ENCOUNTER
She was referred to neurology yesterday and an appointment should have been made.     She could see an outside Neurologist is available but she would need to contact her insurance company.    Did the Voltaren ordered yesterday help with the pain?    If pain is severe, would need to return to ER for further evaluation.

## 2019-01-04 ENCOUNTER — TELEPHONE (OUTPATIENT)
Dept: OBSTETRICS AND GYNECOLOGY | Facility: CLINIC | Age: 24
End: 2019-01-04

## 2019-01-04 ENCOUNTER — HOSPITAL ENCOUNTER (EMERGENCY)
Facility: OTHER | Age: 24
Discharge: PSYCHIATRIC OR SUBSTANCE USE FACILITY | End: 2019-01-04
Attending: EMERGENCY MEDICINE
Payer: COMMERCIAL

## 2019-01-04 ENCOUNTER — HOSPITAL ENCOUNTER (INPATIENT)
Facility: HOSPITAL | Age: 24
LOS: 6 days | Discharge: HOME | DRG: 885 | End: 2019-01-10
Attending: PSYCHIATRY & NEUROLOGY | Admitting: PSYCHIATRY & NEUROLOGY
Payer: COMMERCIAL

## 2019-01-04 VITALS
RESPIRATION RATE: 18 BRPM | BODY MASS INDEX: 18.88 KG/M2 | WEIGHT: 100 LBS | HEIGHT: 61 IN | SYSTOLIC BLOOD PRESSURE: 103 MMHG | DIASTOLIC BLOOD PRESSURE: 59 MMHG | HEART RATE: 87 BPM | TEMPERATURE: 99 F | OXYGEN SATURATION: 97 %

## 2019-01-04 DIAGNOSIS — R45.851 SUICIDE IDEATION: Primary | ICD-10-CM

## 2019-01-04 DIAGNOSIS — F32.89 OTHER DEPRESSION: ICD-10-CM

## 2019-01-04 DIAGNOSIS — F31.81 SEVERE DEPRESSED BIPOLAR II DISORDER WITHOUT PSYCHOTIC FEATURES: ICD-10-CM

## 2019-01-04 DIAGNOSIS — R45.851 SUICIDAL IDEATION: ICD-10-CM

## 2019-01-04 DIAGNOSIS — F33.2 SEVERE EPISODE OF RECURRENT MAJOR DEPRESSIVE DISORDER, WITHOUT PSYCHOTIC FEATURES: ICD-10-CM

## 2019-01-04 DIAGNOSIS — F31.81 BIPOLAR II DISORDER, SEVERE, DEPRESSED, WITH ANXIOUS DISTRESS, IN PARTIAL REMISSION: ICD-10-CM

## 2019-01-04 DIAGNOSIS — Z91.51 HISTORY OF SUICIDE ATTEMPT: ICD-10-CM

## 2019-01-04 LAB
ALBUMIN SERPL BCP-MCNC: 4 G/DL
ALP SERPL-CCNC: 55 U/L
ALT SERPL W/O P-5'-P-CCNC: 13 U/L
AMPHET+METHAMPHET UR QL: NEGATIVE
ANION GAP SERPL CALC-SCNC: 8 MMOL/L
APAP SERPL-MCNC: <3 UG/ML
AST SERPL-CCNC: 16 U/L
B-HCG UR QL: NEGATIVE
BARBITURATES UR QL SCN>200 NG/ML: NEGATIVE
BASOPHILS # BLD AUTO: 0.04 K/UL
BASOPHILS NFR BLD: 1 %
BENZODIAZ UR QL SCN>200 NG/ML: NEGATIVE
BILIRUB SERPL-MCNC: 0.3 MG/DL
BILIRUB UR QL STRIP: NEGATIVE
BUN SERPL-MCNC: 9 MG/DL
BZE UR QL SCN: NEGATIVE
CALCIUM SERPL-MCNC: 9.4 MG/DL
CANNABINOIDS UR QL SCN: NEGATIVE
CHLORIDE SERPL-SCNC: 106 MMOL/L
CLARITY UR: ABNORMAL
CO2 SERPL-SCNC: 24 MMOL/L
COLOR UR: YELLOW
CREAT SERPL-MCNC: 0.8 MG/DL
CREAT UR-MCNC: 157.2 MG/DL
CTP QC/QA: YES
DIFFERENTIAL METHOD: ABNORMAL
EOSINOPHIL # BLD AUTO: 0.2 K/UL
EOSINOPHIL NFR BLD: 5.8 %
ERYTHROCYTE [DISTWIDTH] IN BLOOD BY AUTOMATED COUNT: 11.9 %
EST. GFR  (AFRICAN AMERICAN): >60 ML/MIN/1.73 M^2
EST. GFR  (NON AFRICAN AMERICAN): >60 ML/MIN/1.73 M^2
ETHANOL SERPL-MCNC: <10 MG/DL
GLUCOSE SERPL-MCNC: 85 MG/DL
GLUCOSE UR QL STRIP: NEGATIVE
HCT VFR BLD AUTO: 36.6 %
HGB BLD-MCNC: 12.8 G/DL
HGB UR QL STRIP: NEGATIVE
KETONES UR QL STRIP: NEGATIVE
LEUKOCYTE ESTERASE UR QL STRIP: NEGATIVE
LYMPHOCYTES # BLD AUTO: 1.9 K/UL
LYMPHOCYTES NFR BLD: 45 %
MCH RBC QN AUTO: 30.5 PG
MCHC RBC AUTO-ENTMCNC: 35 G/DL
MCV RBC AUTO: 87 FL
METHADONE UR QL SCN>300 NG/ML: NEGATIVE
MONOCYTES # BLD AUTO: 0.3 K/UL
MONOCYTES NFR BLD: 6.5 %
NEUTROPHILS # BLD AUTO: 1.7 K/UL
NEUTROPHILS NFR BLD: 41.7 %
NITRITE UR QL STRIP: NEGATIVE
OPIATES UR QL SCN: NEGATIVE
PCP UR QL SCN>25 NG/ML: NEGATIVE
PH UR STRIP: 7 [PH] (ref 5–8)
PLATELET # BLD AUTO: 275 K/UL
PMV BLD AUTO: 9.9 FL
POTASSIUM SERPL-SCNC: 3.8 MMOL/L
PROT SERPL-MCNC: 7.4 G/DL
PROT UR QL STRIP: NEGATIVE
RBC # BLD AUTO: 4.19 M/UL
SODIUM SERPL-SCNC: 138 MMOL/L
SP GR UR STRIP: 1.01 (ref 1–1.03)
TOXICOLOGY INFORMATION: NORMAL
TSH SERPL DL<=0.005 MIU/L-ACNC: 1.2 UIU/ML
URN SPEC COLLECT METH UR: ABNORMAL
UROBILINOGEN UR STRIP-ACNC: 1 EU/DL
WBC # BLD AUTO: 4.13 K/UL

## 2019-01-04 PROCEDURE — 80307 DRUG TEST PRSMV CHEM ANLYZR: CPT

## 2019-01-04 PROCEDURE — 12400001 HC PSYCH SEMI-PRIVATE ROOM

## 2019-01-04 PROCEDURE — 80329 ANALGESICS NON-OPIOID 1 OR 2: CPT

## 2019-01-04 PROCEDURE — 81025 URINE PREGNANCY TEST: CPT | Performed by: EMERGENCY MEDICINE

## 2019-01-04 PROCEDURE — 80053 COMPREHEN METABOLIC PANEL: CPT

## 2019-01-04 PROCEDURE — 99285 EMERGENCY DEPT VISIT HI MDM: CPT

## 2019-01-04 PROCEDURE — 80320 DRUG SCREEN QUANTALCOHOLS: CPT

## 2019-01-04 PROCEDURE — 85025 COMPLETE CBC W/AUTO DIFF WBC: CPT

## 2019-01-04 PROCEDURE — 81003 URINALYSIS AUTO W/O SCOPE: CPT | Mod: 59

## 2019-01-04 PROCEDURE — 84443 ASSAY THYROID STIM HORMONE: CPT

## 2019-01-04 RX ORDER — LOPERAMIDE HYDROCHLORIDE 2 MG/1
2 CAPSULE ORAL 4 TIMES DAILY PRN
Status: DISCONTINUED | OUTPATIENT
Start: 2019-01-04 | End: 2019-01-10 | Stop reason: HOSPADM

## 2019-01-04 RX ORDER — CALCIUM CARBONATE 200(500)MG
1000 TABLET,CHEWABLE ORAL 3 TIMES DAILY PRN
Status: DISCONTINUED | OUTPATIENT
Start: 2019-01-04 | End: 2019-01-10 | Stop reason: HOSPADM

## 2019-01-04 RX ORDER — HYDROXYZINE PAMOATE 50 MG/1
50 CAPSULE ORAL NIGHTLY PRN
Status: DISCONTINUED | OUTPATIENT
Start: 2019-01-04 | End: 2019-01-10 | Stop reason: HOSPADM

## 2019-01-04 RX ORDER — HYDROXYZINE PAMOATE 25 MG/1
25 CAPSULE ORAL EVERY 6 HOURS PRN
Status: DISCONTINUED | OUTPATIENT
Start: 2019-01-04 | End: 2019-01-10 | Stop reason: HOSPADM

## 2019-01-04 RX ORDER — ACETAMINOPHEN 325 MG/1
650 TABLET ORAL EVERY 6 HOURS PRN
Status: DISCONTINUED | OUTPATIENT
Start: 2019-01-04 | End: 2019-01-10 | Stop reason: HOSPADM

## 2019-01-04 RX ORDER — OLANZAPINE 10 MG/2ML
10 INJECTION, POWDER, FOR SOLUTION INTRAMUSCULAR ONCE AS NEEDED
Status: DISCONTINUED | OUTPATIENT
Start: 2019-01-04 | End: 2019-01-10 | Stop reason: HOSPADM

## 2019-01-04 RX ORDER — OLANZAPINE 10 MG/1
10 TABLET ORAL EVERY 8 HOURS PRN
Status: DISCONTINUED | OUTPATIENT
Start: 2019-01-04 | End: 2019-01-10 | Stop reason: HOSPADM

## 2019-01-04 NOTE — ED NOTES
Pt lying in bed with eyes closed, respirations even and unlabored, appears in no acute distress. Remains dressed in paper scrubs. Raffaele YAO remains in direct obs of pt.

## 2019-01-04 NOTE — ED TRIAGE NOTES
Pt reports hx of postpartum depression 3yrs ago, denies compliance w/ medications. Pt reports living busy lifestyle w/ school, working 2 jobs, and raising a 3yr old. Pt reports taking multiple previously prescribed muscle relaxer's yesterday in an attempt to kill herself. Pt states she called the mental health crisis line, which she states was helpful, and prompted her to seek help today. Reports 2 failed attempts at establishing a PCP this AM, which lead to today's ED visit. Pt is AAO x 3, answers questions appropriately, calm and cooperative.

## 2019-01-04 NOTE — TELEPHONE ENCOUNTER
----- Message from Estelle Patel sent at 1/4/2019 10:38 AM CST -----  Contact: Pt   Name of Who is Calling: SUMEET ARRIOLA [[id number]]      What is the request in detail: Patient is requesting a call from staff in regards to getting her anti depression medication renewed. Please contact to further discuss and advise      Can the clinic reply by [facility]: No       [facility] Back [facility]: [phone]

## 2019-01-04 NOTE — ED NOTES
Meal tray ordered. Pt resting comfortably in bed. Elnathure at bedside in direct line of site. Pt updated on plan of care and transfer

## 2019-01-04 NOTE — ED NOTES
Admit packet faxed to [facility], [facility], [facility], and [facility]. Awaiting response.

## 2019-01-04 NOTE — ED NOTES
Notified ED nurse of placement at main campus. CPT will notify RN when they can call report to [facility].

## 2019-01-04 NOTE — LETTER
[address]  [facility] [address]  Phone: [phone]  Fax: [phone]

## 2019-01-04 NOTE — ED NOTES
Belongings obtained, labeled and given to security:   Black tennis shoes  Black jeans  Colorful t-shirt

## 2019-01-04 NOTE — LETTER
January 10, 2019         [address]  [place] [address]  Phone: [phone]  Fax: [phone]       Patient: Jeri Galloway   YOB: 1995  Date of stay:01/04/2019-  01/10/2019    To Whom It May Concern:    Michele Galloway  was under my care at [facility] from 01/04/2019 - 01/10/2019. She may return to work/school on Monday 1/14/2019 with no restrictions.  If you have any questions or concerns, or if I can be of further assistance, please do not hesitate to contact me.    Sincerely,    Maddy Stoll MD
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
## 2019-01-04 NOTE — LETTER
January 10, 2019         [address]  [place] [address]  Phone: [phone]  Fax: [phone]       Patient: Jeri Galloway   YOB: 1995  Date of stay:01/04/2019-  01/10/2019    To Whom It May Concern:    Michele Galloway  was under my care at [facility] from 01/04/2019 - 01/10/2019. She may return to work/school on Monday 1/14/2019 with no restrictions.  If you have any questions or concerns, or if I can be of further assistance, please do not hesitate to contact me.    Sincerely,    Maddy Stoll MD

## 2019-01-04 NOTE — ED NOTES
Pt has acceptance at [facility] ([address] [place] [address]).  The number to call report [phone].

## 2019-01-05 LAB
FOLATE SERPL-MCNC: 8 NG/ML
RPR SER QL: NORMAL
T3 SERPL-MCNC: 88 NG/DL
T4 FREE SERPL-MCNC: 0.95 NG/DL
TSH SERPL DL<=0.005 MIU/L-ACNC: 0.61 UIU/ML
VIT B12 SERPL-MCNC: 647 PG/ML

## 2019-01-05 PROCEDURE — 86592 SYPHILIS TEST NON-TREP QUAL: CPT

## 2019-01-05 PROCEDURE — 82607 VITAMIN B-12: CPT

## 2019-01-05 PROCEDURE — 84443 ASSAY THYROID STIM HORMONE: CPT

## 2019-01-05 PROCEDURE — 84480 ASSAY TRIIODOTHYRONINE (T3): CPT

## 2019-01-05 PROCEDURE — 25000003 PHARM REV CODE 250: Performed by: STUDENT IN AN ORGANIZED HEALTH CARE EDUCATION/TRAINING PROGRAM

## 2019-01-05 PROCEDURE — 82746 ASSAY OF FOLIC ACID SERUM: CPT

## 2019-01-05 PROCEDURE — 12400001 HC PSYCH SEMI-PRIVATE ROOM

## 2019-01-05 PROCEDURE — 84439 ASSAY OF FREE THYROXINE: CPT

## 2019-01-05 PROCEDURE — 99222 PR INITIAL HOSPITAL CARE,LEVL II: ICD-10-PCS | Mod: ,,, | Performed by: PSYCHIATRY & NEUROLOGY

## 2019-01-05 PROCEDURE — 36415 COLL VENOUS BLD VENIPUNCTURE: CPT

## 2019-01-05 PROCEDURE — 99222 1ST HOSP IP/OBS MODERATE 55: CPT | Mod: ,,, | Performed by: PSYCHIATRY & NEUROLOGY

## 2019-01-05 RX ORDER — SERTRALINE HYDROCHLORIDE 50 MG/1
50 TABLET, FILM COATED ORAL NIGHTLY
Status: DISCONTINUED | OUTPATIENT
Start: 2019-01-05 | End: 2019-01-06

## 2019-01-05 RX ADMIN — SERTRALINE HYDROCHLORIDE 50 MG: 50 TABLET ORAL at 08:01
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
## 2019-01-05 NOTE — ED NOTES
Pt resting in bed comfortably at this time eyes closed and respirations even and unlabored, bathroom needs addressed and comfort situated. PEC in place and one to one direct observation maintained with Jessica HINDS at this time. Pt medically cleared and awaiting transport . Will be available for pt's needs and will continue to monitor pt.

## 2019-01-05 NOTE — ED NOTES
Pt lying in bed with eyes closed, respirations even and unlabored, appears in no acute distress. Bed in lowest, locked position, side rails up x 2. Sitter Bertha T, in direct obs of pt.

## 2019-01-05 NOTE — PLAN OF CARE
01/05/19 1100   [facility] Group Therapy   Group Name Medication   Participation Level Appropriate;Attentive   Participation Quality Cooperative   Insight/Motivation Improved   Affect/Mood Display Appropriate   Cognition Alert

## 2019-01-05 NOTE — ED NOTES
Pt care assumed at this time.   Pt resting in bed comfortably at this time eyes closed and respirations even and unlabored, bathroom needs addressed and comfort situated. PEC in place and one to one direct observation maintained with Jessica HINDS at this time. Pt medically cleared and awaiting transport . Will be available for pt's needs and will continue to monitor pt.

## 2019-01-05 NOTE — ASSESSMENT & PLAN NOTE
Patient with worsening depression for the past couple months, resulting in 4 suicide attempts via overdose and cutting. Attributes depression due to stressors with school, family expectations, and work. Previous trial of Zoloft but was not on medication long enough to assess its efficacy. Reports depressive symptoms of increased sleep, poor appetite, poor concentration, guilt, hopelessness.     · Defer scheduled medications to inpatient team  Zyprexa 10mg PO/IM Q8H PRN for non-redirectable agitation  Vistaril 50mg PO Q6H PRN for anxiety/insomnia
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
## 2019-01-05 NOTE — PROGRESS NOTES
01/05/19 0900 01/05/19 1000 01/05/19 1100   [facility] Group Therapy   Group Name Community Reintegration Reminiscing Medication   Specific Interventions Current Events Other (see comments)  (emotion can) --    Participation Level None None Appropriate;Attentive   Participation Quality Withdrawn Withdrawn Cooperative   Insight/Motivation --  --  Improved   Affect/Mood Display --  --  Appropriate   Cognition --  --  Alert       01/05/19 1400   [facility] Group Therapy   Group Name Therapeutic Recreation   Specific Interventions Skilled Activity Mild Exercises   Participation Level None   Participation Quality Withdrawn   Insight/Motivation --    Affect/Mood Display --    Cognition --

## 2019-01-05 NOTE — PLAN OF CARE
Problem: Adult Inpatient Plan of Care  Goal: Plan of Care Review  Outcome: Ongoing (interventions implemented as appropriate)  Patient alert and oriented. Patient calm and cooperative. Patient safety maintained. Will continue to monitor.
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
## 2019-01-05 NOTE — SUBJECTIVE & OBJECTIVE
Patient History           Medical as of 1/4/2019     Past Medical History     Diagnosis Date Comments Source    Depression -- -- Provider    Migraine -- complex Provider    Psychiatric problem -- -- Provider    Urinary tract infection -- -- Provider          Pertinent Negatives     Diagnosis Date Noted Comments Source    Allergy 06/11/2018 -- Provider    Asthma 06/11/2018 -- Provider    Diabetes mellitus 06/11/2018 -- Provider    Disorder of kidney and ureter 06/11/2018 -- Provider    History of psychiatric hospitalization 01/04/2019 -- Provider    HIV infection 06/11/2018 -- Provider    Hx of psychiatric care 01/04/2019 -- Provider    Hypertension 06/11/2018 -- Provider    Kidney stone 06/11/2018 -- Provider    STD (sexually transmitted disease) 06/11/2018 -- Provider    Therapy 01/04/2019 -- Provider                  Surgical as of 1/4/2019     Past Surgical History     Procedure Laterality Date Comments Source    none [Other] -- -- -- Provider                  Family as of 1/4/2019     Problem Relation Name Age of Onset Comments Source    Hypertension Father -- -- -- Provider    Hypertension Mother -- -- -- Provider    Diabetes Maternal Grandmother -- -- -- Provider    Cancer Paternal Grandmother -- -- -- Provider            Tobacco Use as of 1/4/2019     Smoking Status Smoking Start Date Smoking Quit Date Packs/Day Years Used    Never Smoker -- -- -- --    Types Comments Smokeless Tobacco Status Smokeless Tobacco Quit Date Source    -- -- Never Used -- Provider            Alcohol Use as of 1/4/2019     Alcohol Use Drinks/Week Alcohol/Week Comments Source    Yes -- -- Occasionally Provider    Frequency Standard Drinks Binge Drinking Source      -- -- -- Provider             Drug Use as of 1/4/2019     Drug Use Types Frequency Comments Source    No -- -- -- Provider            Sexual Activity as of 1/4/2019     Sexually Active Birth Control Partners Comments Source    Yes Injection Male -- Provider             Activities of Daily Living as of 1/4/2019     Activities of Daily Living Question Response Comments Source    Patient feels they ought to cut down on drinking/drug use Not Asked -- Provider    Patient annoyed by others criticizing their drinking/drug use Not Asked -- Provider    Patient has felt bad or guilty about drinking/drug use Not Asked -- Provider    Patient has had a drink/used drugs as an eye opener in the AM Not Asked -- Provider            Social Documentation as of 1/4/2019    None           Occupational as of 1/4/2019    None           Socioeconomic as of 1/4/2019     Marital Status Spouse Name Number of Children Years Education Education Level Preferred Language Ethnicity Race Source    Single -- -- -- -- English /Black Black or  Provider    Financial Resource Strain Food Insecurity: Worry Food Insecurity: Inability Transportation Needs: Medical Transportation Needs: Non-medical       -- -- -- -- --             Pertinent History     Question Response Comments    Lives with family --    Place in Birth Order -- --    Lives in home --    Number of Siblings -- --    Raised by -- --    Legal Involvement -- --    Childhood Trauma -- --    Criminal History of -- --    Financial Status -- --    Highest Level of Education -- --    Does patient have access to a firearm? -- --     Service -- --    Primary Leisure Activity -- --    Spirituality -- --        Past Medical History:   Diagnosis Date    Depression     Migraine     complex    Psychiatric problem     Urinary tract infection      Past Surgical History:   Procedure Laterality Date    none       Family History     Problem Relation (Age of Onset)    Cancer Paternal Grandmother    Diabetes Maternal Grandmother    Hypertension Father, Mother        Tobacco Use    Smoking status: Never Smoker    Smokeless tobacco: Never Used   Substance and Sexual Activity    Alcohol use: Yes     Comment: Occasionally    Drug  "use: No    Sexual activity: Yes     Partners: Male     Birth control/protection: Injection     Review of patient's allergies indicates:   Allergen Reactions    Latex, natural rubber Hives       No current facility-administered medications on file prior to encounter.      Current Outpatient Medications on File Prior to Encounter   Medication Sig    clotrimazole-betamethasone 1-0.05% (LOTRISONE) cream Apply topically 2 (two) times daily.    diclofenac (VOLTAREN) 50 MG EC tablet Take 1 tablet (50 mg total) by mouth 2 (two) times daily as needed.    fluconazole (DIFLUCAN) 200 MG Tab Take 1 tablet (200 mg total) by mouth every 72 hours as needed.    medroxyPROGESTERone (DEPO-PROVERA) 150 mg/mL Syrg Inject 1 mL (150 mg total) into the muscle every 3 (three) months.     Psychotherapeutics (From admission, onward)    None        Review of Systems   All other systems reviewed and are negative.    Strengths and Liabilities: Strength: Patient is physically healthy., Strength: Patient has positive support network., Liability: Patient is impulsive., Liability: Patient lacks coping skills.    Objective:     Vital Signs (Most Recent):  Temp: 98.2 °F (36.8 °C) (01/04/19 2130)  Pulse: (!) 55 (01/04/19 2130)  Resp: 16 (01/04/19 2130)  BP: (!) 104/58 (01/04/19 2130) Vital Signs (24h Range):  Temp:  [98 °F (36.7 °C)-98.5 °F (36.9 °C)] 98.2 °F (36.8 °C)  Pulse:  [55-87] 55  Resp:  [16-18] 16  SpO2:  [97 %-100 %] 97 %  BP: (102-140)/(55-87) 104/58     Height: 5' 1" (154.9 cm)  Weight: 44.2 kg (97 lb 7.1 oz)  Body mass index is 18.41 kg/m².    No intake or output data in the 24 hours ending 01/04/19 2146    Physical Exam   Constitutional: She is oriented to person, place, and time. She appears well-developed and well-nourished.   HENT:   Head: Normocephalic and atraumatic.   Eyes: EOM are normal. Pupils are equal, round, and reactive to light.   Cardiovascular: Normal rate, regular rhythm, normal heart sounds and intact distal " pulses.   Pulmonary/Chest: Effort normal and breath sounds normal.   Abdominal: Soft. Bowel sounds are normal. She exhibits no distension. There is no tenderness.   Neurological: She is oriented to person, place, and time. Gait normal.   Skin: Skin is warm and dry.   Psychiatric:   Mental Status Exam:  Appearance: unremarkable, age appropriate  Behavior/Cooperation: normal, friendly and cooperative, eye contact normal  Speech: normal tone, normal rate, normal pitch, normal volume  Mood: depressed  Affect: normal  Thought Process: normal and logical  Thought Content: normal, no suicidality, no homicidality, delusions, or paranoia   Orientation: grossly intact  Memory: Grossly intact  Attention Span/Concentration: Normal  Insight: fair  Judgment: limited       NEUROLOGICAL EXAMINATION:     MENTAL STATUS   Oriented to person, place, and time.     CRANIAL NERVES     CN II   Visual fields full to confrontation.     CN III, IV, VI   Pupils are equal, round, and reactive to light.  Extraocular motions are normal.     CN V   Facial sensation intact.     CN VII   Facial expression full, symmetric.     CN VIII   CN VIII normal.     CN IX, X   CN IX normal.   CN X normal.     CN XI   CN XI normal.     CN XII   CN XII normal.     SENSORY EXAM   Light touch normal.     GAIT AND COORDINATION     Gait  Gait: normal    Significant Labs:   Recent Results (from the past 48 hour(s))   CBC auto differential    Collection Time: 01/04/19 12:29 PM   Result Value Ref Range    WBC 4.13 3.90 - 12.70 K/uL    RBC 4.19 4.00 - 5.40 M/uL    Hemoglobin 12.8 12.0 - 16.0 g/dL    Hematocrit 36.6 (L) 37.0 - 48.5 %    MCV 87 82 - 98 fL    MCH 30.5 27.0 - 31.0 pg    MCHC 35.0 32.0 - 36.0 g/dL    RDW 11.9 11.5 - 14.5 %    Platelets 275 150 - 350 K/uL    MPV 9.9 9.2 - 12.9 fL    Gran # (ANC) 1.7 (L) 1.8 - 7.7 K/uL    Lymph # 1.9 1.0 - 4.8 K/uL    Mono # 0.3 0.3 - 1.0 K/uL    Eos # 0.2 0.0 - 0.5 K/uL    Baso # 0.04 0.00 - 0.20 K/uL    Gran% 41.7 38.0 -  73.0 %    Lymph% 45.0 18.0 - 48.0 %    Mono% 6.5 4.0 - 15.0 %    Eosinophil% 5.8 0.0 - 8.0 %    Basophil% 1.0 0.0 - 1.9 %    Differential Method Automated    Comprehensive metabolic panel    Collection Time: 01/04/19 12:29 PM   Result Value Ref Range    Sodium 138 136 - 145 mmol/L    Potassium 3.8 3.5 - 5.1 mmol/L    Chloride 106 95 - 110 mmol/L    CO2 24 23 - 29 mmol/L    Glucose 85 70 - 110 mg/dL    BUN, Bld 9 6 - 20 mg/dL    Creatinine 0.8 0.5 - 1.4 mg/dL    Calcium 9.4 8.7 - 10.5 mg/dL    Total Protein 7.4 6.0 - 8.4 g/dL    Albumin 4.0 3.5 - 5.2 g/dL    Total Bilirubin 0.3 0.1 - 1.0 mg/dL    Alkaline Phosphatase 55 55 - 135 U/L    AST 16 10 - 40 U/L    ALT 13 10 - 44 U/L    Anion Gap 8 8 - 16 mmol/L    eGFR if African American >60 >60 mL/min/1.73 m^2    eGFR if non African American >60 >60 mL/min/1.73 m^2   TSH    Collection Time: 01/04/19 12:29 PM   Result Value Ref Range    TSH 1.197 0.400 - 4.000 uIU/mL   Ethanol    Collection Time: 01/04/19 12:29 PM   Result Value Ref Range    Alcohol, Medical, Serum <10 <10 mg/dL   Acetaminophen level    Collection Time: 01/04/19 12:29 PM   Result Value Ref Range    Acetaminophen (Tylenol), Serum <3.0 (L) 10.0 - 20.0 ug/mL   Drug screen panel, emergency    Collection Time: 01/04/19 12:49 PM   Result Value Ref Range    Benzodiazepines Negative     Methadone metabolites Negative     Cocaine (Metab.) Negative     Opiate Scrn, Ur Negative     Barbiturate Screen, Ur Negative     Amphetamine Screen, Ur Negative     THC Negative     Phencyclidine Negative     Creatinine, Random Ur 157.2 15.0 - 325.0 mg/dL    Toxicology Information SEE COMMENT    POCT urine pregnancy    Collection Time: 01/04/19 12:50 PM   Result Value Ref Range    POC Preg Test, Ur Negative Negative     Acceptable Yes    Urinalysis, Reflex to Urine Culture Urine, Clean Catch    Collection Time: 01/04/19 12:50 PM   Result Value Ref Range    Specimen UA Urine, Clean Catch     Color, UA Yellow Yellow,  Straw, Debby    Appearance, UA Hazy (A) Clear    pH, UA 7.0 5.0 - 8.0    Specific Gravity, UA 1.015 1.005 - 1.030    Protein, UA Negative Negative    Glucose, UA Negative Negative    Ketones, UA Negative Negative    Bilirubin (UA) Negative Negative    Occult Blood UA Negative Negative    Nitrite, UA Negative Negative    Urobilinogen, UA 1.0 <2.0 EU/dL    Leukocytes, UA Negative Negative      No results found for: PHENYTOIN, PHENOBARB, VALPROATE, CBMZ      Significant Imaging: I have reviewed all pertinent imaging results/findings within the past 24 hours.

## 2019-01-06 PROCEDURE — 99232 SBSQ HOSP IP/OBS MODERATE 35: CPT | Mod: ,,, | Performed by: PSYCHIATRY & NEUROLOGY

## 2019-01-06 PROCEDURE — 12400001 HC PSYCH SEMI-PRIVATE ROOM

## 2019-01-06 PROCEDURE — 99232 PR SUBSEQUENT HOSPITAL CARE,LEVL II: ICD-10-PCS | Mod: ,,, | Performed by: PSYCHIATRY & NEUROLOGY

## 2019-01-06 PROCEDURE — 25000003 PHARM REV CODE 250: Performed by: PSYCHIATRY & NEUROLOGY

## 2019-01-06 RX ORDER — SERTRALINE HYDROCHLORIDE 50 MG/1
50 TABLET, FILM COATED ORAL DAILY
Status: DISCONTINUED | OUTPATIENT
Start: 2019-01-06 | End: 2019-01-06

## 2019-01-06 RX ORDER — SERTRALINE HYDROCHLORIDE 100 MG/1
100 TABLET, FILM COATED ORAL DAILY
Status: DISCONTINUED | OUTPATIENT
Start: 2019-01-07 | End: 2019-01-09

## 2019-01-06 RX ADMIN — SERTRALINE HYDROCHLORIDE 50 MG: 50 TABLET ORAL at 08:01

## 2019-01-06 NOTE — SUBJECTIVE & OBJECTIVE
"Interval History: see hospital course    Family History     Problem Relation (Age of Onset)    Cancer Paternal Grandmother    Diabetes Maternal Grandmother    Hypertension Father, Mother        Tobacco Use    Smoking status: Never Smoker    Smokeless tobacco: Never Used   Substance and Sexual Activity    Alcohol use: Yes     Comment: Occasionally    Drug use: No    Sexual activity: Yes     Partners: Male     Birth control/protection: Injection     Psychotherapeutics (From admission, onward)    Start     Stop Route Frequency Ordered    01/07/19 0900  sertraline tablet 100 mg      -- Oral Daily 01/06/19 0936    01/04/19 2355  OLANZapine tablet 10 mg      -- Oral Every 8 hours PRN 01/04/19 2255    01/04/19 2355  OLANZapine injection 10 mg      06/02 1555 IM Once as needed 01/04/19 2255           Review of Systems   Constitutional: Negative for appetite change.   HENT: Negative for congestion.    Cardiovascular: Negative for chest pain.   Gastrointestinal: Negative for abdominal pain, nausea and vomiting.     Objective:     Vital Signs (Most Recent):  Temp: 98.2 °F (36.8 °C) (01/06/19 0739)  Pulse: 78 (01/06/19 0739)  Resp: 18 (01/06/19 0739)  BP: 113/77 (01/06/19 0739) Vital Signs (24h Range):  Temp:  [98.2 °F (36.8 °C)-98.3 °F (36.8 °C)] 98.2 °F (36.8 °C)  Pulse:  [65-78] 78  Resp:  [16-18] 18  BP: (113-122)/(69-77) 113/77     Height: 5' 1" (154.9 cm)  Weight: 44.2 kg (97 lb 7.1 oz)  Body mass index is 18.41 kg/m².    No intake or output data in the 24 hours ending 01/06/19 0937    Physical Exam   Psychiatric:   Mental Status Exam:  Appearance: unremarkable, age appropriate  Behavior/Cooperation: normal, friendly and cooperative, eye contact normal  Speech: normal tone, normal rate, normal pitch, normal volume  Mood: "good"  Affect: normal  Thought Process: normal and logical  Thought Content: normal, no suicidality, no homicidality, delusions, or paranoia   Orientation: grossly intact  Memory: Grossly " intact  Attention Span/Concentration: Normal  Insight: fair insight into depression and recent suicide attempt  Judgment: fair AEB realized need for continued hospitalization            Significant Labs:   Recent Results (from the past 48 hour(s))   CBC auto differential    Collection Time: 01/04/19 12:29 PM   Result Value Ref Range    WBC 4.13 3.90 - 12.70 K/uL    RBC 4.19 4.00 - 5.40 M/uL    Hemoglobin 12.8 12.0 - 16.0 g/dL    Hematocrit 36.6 (L) 37.0 - 48.5 %    MCV 87 82 - 98 fL    MCH 30.5 27.0 - 31.0 pg    MCHC 35.0 32.0 - 36.0 g/dL    RDW 11.9 11.5 - 14.5 %    Platelets 275 150 - 350 K/uL    MPV 9.9 9.2 - 12.9 fL    Gran # (ANC) 1.7 (L) 1.8 - 7.7 K/uL    Lymph # 1.9 1.0 - 4.8 K/uL    Mono # 0.3 0.3 - 1.0 K/uL    Eos # 0.2 0.0 - 0.5 K/uL    Baso # 0.04 0.00 - 0.20 K/uL    Gran% 41.7 38.0 - 73.0 %    Lymph% 45.0 18.0 - 48.0 %    Mono% 6.5 4.0 - 15.0 %    Eosinophil% 5.8 0.0 - 8.0 %    Basophil% 1.0 0.0 - 1.9 %    Differential Method Automated    Comprehensive metabolic panel    Collection Time: 01/04/19 12:29 PM   Result Value Ref Range    Sodium 138 136 - 145 mmol/L    Potassium 3.8 3.5 - 5.1 mmol/L    Chloride 106 95 - 110 mmol/L    CO2 24 23 - 29 mmol/L    Glucose 85 70 - 110 mg/dL    BUN, Bld 9 6 - 20 mg/dL    Creatinine 0.8 0.5 - 1.4 mg/dL    Calcium 9.4 8.7 - 10.5 mg/dL    Total Protein 7.4 6.0 - 8.4 g/dL    Albumin 4.0 3.5 - 5.2 g/dL    Total Bilirubin 0.3 0.1 - 1.0 mg/dL    Alkaline Phosphatase 55 55 - 135 U/L    AST 16 10 - 40 U/L    ALT 13 10 - 44 U/L    Anion Gap 8 8 - 16 mmol/L    eGFR if African American >60 >60 mL/min/1.73 m^2    eGFR if non African American >60 >60 mL/min/1.73 m^2   TSH    Collection Time: 01/04/19 12:29 PM   Result Value Ref Range    TSH 1.197 0.400 - 4.000 uIU/mL   Ethanol    Collection Time: 01/04/19 12:29 PM   Result Value Ref Range    Alcohol, Medical, Serum <10 <10 mg/dL   Acetaminophen level    Collection Time: 01/04/19 12:29 PM   Result Value Ref Range    Acetaminophen  (Tylenol), Serum <3.0 (L) 10.0 - 20.0 ug/mL   Drug screen panel, emergency    Collection Time: 01/04/19 12:49 PM   Result Value Ref Range    Benzodiazepines Negative     Methadone metabolites Negative     Cocaine (Metab.) Negative     Opiate Scrn, Ur Negative     Barbiturate Screen, Ur Negative     Amphetamine Screen, Ur Negative     THC Negative     Phencyclidine Negative     Creatinine, Random Ur 157.2 15.0 - 325.0 mg/dL    Toxicology Information SEE COMMENT    POCT urine pregnancy    Collection Time: 01/04/19 12:50 PM   Result Value Ref Range    POC Preg Test, Ur Negative Negative     Acceptable Yes    Urinalysis, Reflex to Urine Culture Urine, Clean Catch    Collection Time: 01/04/19 12:50 PM   Result Value Ref Range    Specimen UA Urine, Clean Catch     Color, UA Yellow Yellow, Straw, Debby    Appearance, UA Hazy (A) Clear    pH, UA 7.0 5.0 - 8.0    Specific Gravity, UA 1.015 1.005 - 1.030    Protein, UA Negative Negative    Glucose, UA Negative Negative    Ketones, UA Negative Negative    Bilirubin (UA) Negative Negative    Occult Blood UA Negative Negative    Nitrite, UA Negative Negative    Urobilinogen, UA 1.0 <2.0 EU/dL    Leukocytes, UA Negative Negative   Folate    Collection Time: 01/05/19  5:11 AM   Result Value Ref Range    Folate 8.0 4.0 - 24.0 ng/mL   RPR    Collection Time: 01/05/19  5:11 AM   Result Value Ref Range    RPR Non-reactive Non-reactive   T3    Collection Time: 01/05/19  5:11 AM   Result Value Ref Range    T3, Total 88 60 - 180 ng/dL   T4, free    Collection Time: 01/05/19  5:11 AM   Result Value Ref Range    Free T4 0.95 0.71 - 1.51 ng/dL   TSH    Collection Time: 01/05/19  5:11 AM   Result Value Ref Range    TSH 0.608 0.400 - 4.000 uIU/mL   Vitamin B12    Collection Time: 01/05/19  5:11 AM   Result Value Ref Range    Vitamin B-12 647 210 - 950 pg/mL      No results found for: PHENYTOIN, PHENOBARB, VALPROATE, CBMZ      Significant Imaging: I have reviewed all pertinent  imaging results/findings within the past 24 hours.

## 2019-01-06 NOTE — PROGRESS NOTES
01/06/19 0900 01/06/19 1000   [facility] Group Therapy   Group Name Community Reintegration Mental Awareness   Specific Interventions Current Events (tribond team building)   Participation Level Active;Appropriate Active;Appropriate   Participation Quality Cooperative;Social Cooperative;Social   Insight/Motivation Good Good   Affect/Mood Display Appropriate Appropriate   Cognition Alert;Oriented Alert;Oriented

## 2019-01-06 NOTE — PROGRESS NOTES
01/06/19 1115 01/06/19 1215 01/06/19 1415   [facility] Group Therapy   Group Name Stress Management Therapeutic Recreation Therapeutic Recreation   Specific Interventions Guided Imagery/Relaxation (football social) Skilled Activity Crafts   Participation Level Appropriate;Attentive Appropriate Appropriate   Participation Quality Cooperative Cooperative;Social Cooperative;Social   Insight/Motivation Good Good Good   Affect/Mood Display Appropriate Appropriate Appropriate   Cognition Alert;Oriented Alert;Oriented Alert;Oriented

## 2019-01-06 NOTE — NURSING
Patient reports that her mood is good.  She denies any current suicidal ideations.  She reports no AVH  Does report poor sleep.  Focused on discharged.  No prn medication needed today.  No somatic complaints.  Appearance is appropriate. Attend groups.  Pleasant upon approach.

## 2019-01-06 NOTE — PLAN OF CARE
01/06/19 1100   [facility] Group Therapy   Group Name Education   Participation Level Appropriate   Participation Quality Withdrawn;Cooperative   Insight/Motivation Improved   Affect/Mood Display Appropriate;Blunted   Cognition Alert

## 2019-01-06 NOTE — ASSESSMENT & PLAN NOTE
Patient with worsening depression for the past couple months, resulting in 4 suicide attempts via overdose and cutting. Attributes depression due to stressors with school, family expectations, and work. Previous trial of Zoloft but was not on medication long enough to assess its efficacy. Reports depressive symptoms of increased sleep, poor appetite, poor concentration, guilt, hopelessness.     · Zoloft 50mg po daily started on 1/5, increased to 100mg for 1/6  Zyprexa 10mg PO/IM Q8H PRN for non-redirectable agitation  Vistaril 50mg PO Q6H PRN for anxiety/insomnia
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
## 2019-01-06 NOTE — HOSPITAL COURSE
"1/6/2019   Patient calm and cooperative with interview. Mood is "good". Denies SI, HI, AVH. Reports poor sleep, but attributes this to being in a new environment. Phone conversation with mother was supportive and positive. Focused on discharge, but understands the need for continued hospitalization in light of her recent multiple suicide attempts. Patient has been medication compliant, no medication side-effects reported. Amenable to increased Zoloft dose of 100mg for tomorrow morning. Received no psychiatric PRNs in the past 24 hours. No somatic complaints, no other complaints during interview.    01/07/2019  Pt enters treatment team and discusses HPI as per previously noted. She states that her family is more "osteopathic" and that they believe more in "praying". Family history reviewed. She reports 1 maternal and 1 paternal uncle who committed suicide. One was depressed and one "had a chemical imbalance that messed him up". She reports that her father has depression and her little brother "pops pills". Denies family h/o schizophrenia or bipolar disorder. She reports that she does have periods where she feels she is more productive and "can get everything done that I need do". Denies paranoia or AVH. She denies SI currently. Discussed with patient that she may have experienced hypomania and discussed starting lamictal to help stabilize her mood. Discussed SE of lamictal including SE of SJS. Patient was provided with information about zoloft, lamictal, and folic acid.     01/08/2019  Pt signed FVA yesterday. Per staff she has been cooperative and participating in groups appropriately. Pt states her mood is "good" but endorses poor sleep which she attributes to the unit being noisy. Pt discussed her father and how he "disappeared" for a couple days and has been in and out of the hospital with the flu. Pt admits that he is not very dependable. He currently lives in [place]. Her aunt lives in the area and provides " ""words of encouragement" but pt does not appear to have a reliable support system. SWer will attempt to obtain resources that are targeted towards college aged people. The father of her child has been helping the pt minimally and does not help her financially, his mother will occasionally watch her baby.     01/09/2019  Patient has been participating appropriately and is calm, cooperative and pleasant. She was informed that that Dr. Schneider has time today to speak with her 1:1. She states that she had a lot of visitors yesterday which was a "surprise" and they expressed their availability if she needs help. Pt states that her mother who lives in [place] will come and stay with her in [place] "for as long as I need". Informed about resources available through her school; will need psychiatrist to manage medications. She describes her mood as "happy" and displays a full reactive affect in treatment team. Discussed suspected dx of bipolar II disorder and importance of adequate sleep and  current medications - zoloft and lamictal. She endorses trouble sleeping which she attributes to being on the unit. Will decrease zoloft to 75 mg as she feels that it is activating and trial remeron 15 mg for sleep tonight.     01/10/2019  Patient seen in treatment team this AM, she is calm, cooperative, and pleasant and displays a bright reactive affect. She states her mood is "good" and she expresses that she feels safe to go home. Discussed outpatient follow up. Patient slept well with remeron.  "

## 2019-01-06 NOTE — PLAN OF CARE
Problem: Adult Inpatient Plan of Care  Goal: Plan of Care Review  Outcome: Ongoing (interventions implemented as appropriate)  Visible on unit.  Compliant with scheduled HS medication.  Flat affect noted.  No SI/HI/AVH reported thus far this shift.  Retired to bed at appropriate time.  Safety maintained.

## 2019-01-06 NOTE — PROGRESS NOTES
01/05/19 2000   [facility] Group Therapy   Group Name Community Reintegration   Specific Interventions Current Events   Participation Level Minimal   Participation Quality Withdrawn   Insight/Motivation Good   Affect/Mood Display Flat   Cognition Alert;Oriented

## 2019-01-06 NOTE — PLAN OF CARE
Problem: Adult Behavioral Health Plan of Care  Goal: Plan of Care Review  Outcome: Ongoing (interventions implemented as appropriate)  Patient is sleeping in her room at this time.   She is cooperative with rules and routines.  Medication compliant.  Appears apprehensive when taking medication.  Attends select groups.    Goal: Adheres to Safety Considerations for Self and Others  Outcome: Ongoing (interventions implemented as appropriate)  Adheres to safety rules and routines.   Goal: Optimized Coping Skills in Response to Life Stressors  Outcome: Ongoing (interventions implemented as appropriate)  Patient verbalizes understanding of teaching on coping skills  Goal: Develops/Participates in Therapeutic [place] to Support Successful Transition  Outcome: Ongoing (interventions implemented as appropriate)  Participates in the therapeutic alliance    Problem: Feelings of Worthlessness, Hopelessness, Excessive Guilt (Depressive Signs/Symptoms)  Goal: Enhanced Self-Esteem and Confidence (Depressive Signs/Symptoms)  Outcome: Ongoing (interventions implemented as appropriate)  Patient reports her self esteem and confidence are intact    Problem: Mood Impairment (Depressive Signs/Symptoms)  Goal: Improved Mood Symptoms (Depressive Signs/Symptoms)  Outcome: Ongoing (interventions implemented as appropriate)  Patient cont to deny any suicidal ideations.  Mood is improving.  Less depressed.

## 2019-01-07 PROBLEM — F31.81 BIPOLAR 2 DISORDER, MAJOR DEPRESSIVE EPISODE: Status: ACTIVE | Noted: 2019-01-07

## 2019-01-07 PROCEDURE — 99233 PR SUBSEQUENT HOSPITAL CARE,LEVL III: ICD-10-PCS | Mod: ,,, | Performed by: PSYCHIATRY & NEUROLOGY

## 2019-01-07 PROCEDURE — 90853 PR GROUP PSYCHOTHERAPY: ICD-10-PCS | Mod: ,,, | Performed by: PSYCHOLOGIST

## 2019-01-07 PROCEDURE — 99233 SBSQ HOSP IP/OBS HIGH 50: CPT | Mod: ,,, | Performed by: PSYCHIATRY & NEUROLOGY

## 2019-01-07 PROCEDURE — 25000003 PHARM REV CODE 250: Performed by: PSYCHIATRY & NEUROLOGY

## 2019-01-07 PROCEDURE — 90853 GROUP PSYCHOTHERAPY: CPT | Mod: ,,, | Performed by: PSYCHOLOGIST

## 2019-01-07 PROCEDURE — 12400001 HC PSYCH SEMI-PRIVATE ROOM

## 2019-01-07 RX ORDER — LAMOTRIGINE 25 MG/1
25 TABLET ORAL DAILY
Status: DISCONTINUED | OUTPATIENT
Start: 2019-01-07 | End: 2019-01-10 | Stop reason: HOSPADM

## 2019-01-07 RX ADMIN — LAMOTRIGINE 25 MG: 25 TABLET ORAL at 02:01

## 2019-01-07 RX ADMIN — SERTRALINE HYDROCHLORIDE 100 MG: 100 TABLET ORAL at 08:01

## 2019-01-07 RX ADMIN — Medication 1 TABLET: at 02:01

## 2019-01-07 RX ADMIN — HYDROXYZINE PAMOATE 50 MG: 50 CAPSULE ORAL at 08:01

## 2019-01-07 NOTE — PLAN OF CARE
01/07/19 1600   [facility] Group Therapy   Group Name Medication   Participation Level Appropriate;Attentive;Sharing   Participation Quality Cooperative   Insight/Motivation Good   Affect/Mood Display Appropriate   Cognition Alert
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
## 2019-01-07 NOTE — PROGRESS NOTES
01/06/19 2000   [facility] Group Therapy   Group Name Community Reintegration   Specific Interventions Current Events   Participation Level Appropriate   Participation Quality Cooperative   Insight/Motivation Good   Affect/Mood Display Appropriate   Cognition Alert;Oriented

## 2019-01-07 NOTE — PROGRESS NOTES
Group Psychotherapy (PhD/LCSW)    Site: [facility]    Clinical status of patient: Inpatient    Date: 1/7/2019    Group Focus: Life Skills    Length of service: 47306 - 35-40 minutes    Number of patients in attendance: 9    Referred by: Acute Psychiatry Unit Treatment Team    Target symptoms: Depression    Patient's response to treatment: Active Listening and Self-disclosure    Progress toward goals: Progressing slowly    Interval History: Pt appeared alert and attentive in group. Pt participated appropriately when prompted in a discussion of the value of creative outlets for coping with emotional distress (eg, art, music, dance, writing, etc). Pt stated that reading (both fiction and non-fiction has been an important coping tool for her.     Diagnosis: Major Depressive d/o, recurrent, severe, without psychotic features     Plan: Continue treatment on [facility]

## 2019-01-07 NOTE — PLAN OF CARE
Problem: Adult Behavioral Health Plan of Care  Goal: Plan of Care Review  Outcome: Ongoing (interventions implemented as appropriate)  Patient ambulating in hallway. Patient alert and oriented. Patient calm and cooperative and interactive with peers and staff. Patient denies any auditory or visual hallucinations. Patient safety maintained. Will continue to monitor.

## 2019-01-07 NOTE — ASSESSMENT & PLAN NOTE
Patient with worsening depression for the past couple months, resulting in 4 suicide attempts via overdose and cutting. Attributes depression due to stressors with school, family expectations, and work. Previous trial of Zoloft but was not on medication long enough to assess its efficacy. Reports depressive symptoms of increased sleep, poor appetite, poor concentration, guilt, hopelessness.       · Zoloft 50mg po daily started on 1/5, increased to 100mg for 1/6  Zyprexa 10mg PO/IM Q8H PRN for non-redirectable agitation  Vistaril 50mg PO Q6H PRN for anxiety/insomnia  Folic acid    -some concern for possible Bipolar 2  disorder by history- will start lamictal 25 mg

## 2019-01-07 NOTE — SUBJECTIVE & OBJECTIVE
"Interval History: see hospital course    Family History     Problem Relation (Age of Onset)    Cancer Paternal Grandmother    Diabetes Maternal Grandmother    Hypertension Father, Mother        Tobacco Use    Smoking status: Never Smoker    Smokeless tobacco: Never Used   Substance and Sexual Activity    Alcohol use: Yes     Comment: Occasionally    Drug use: No    Sexual activity: Yes     Partners: Male     Birth control/protection: Injection     Psychotherapeutics (From admission, onward)    Start     Stop Route Frequency Ordered    01/07/19 0900  sertraline tablet 100 mg      -- Oral Daily 01/06/19 0936    01/04/19 2355  OLANZapine tablet 10 mg      -- Oral Every 8 hours PRN 01/04/19 2255    01/04/19 2355  OLANZapine injection 10 mg      06/02 1555 IM Once as needed 01/04/19 2255           Review of Systems   Constitutional: Negative for appetite change.   Cardiovascular: Negative for chest pain.   Gastrointestinal: Negative for abdominal pain, nausea and vomiting.     Objective:     Vital Signs (Most Recent):  Temp: 98 °F (36.7 °C) (01/07/19 0729)  Pulse: 79 (01/07/19 0729)  Resp: 17 (01/07/19 0729)  BP: (!) 110/56 (01/07/19 0729) Vital Signs (24h Range):  Temp:  [98 °F (36.7 °C)-98.2 °F (36.8 °C)] 98 °F (36.7 °C)  Pulse:  [62-79] 79  Resp:  [17-18] 17  BP: (110-115)/(51-56) 110/56     Height: 5' 1" (154.9 cm)  Weight: 44.2 kg (97 lb 7.1 oz)  Body mass index is 18.41 kg/m².    No intake or output data in the 24 hours ending 01/07/19 1102    Physical Exam   Constitutional: She appears well-developed and well-nourished. No distress.   HENT:   Head: Normocephalic and atraumatic.   Eyes: EOM are normal.   Skin: She is not diaphoretic.   Psychiatric:   Mental Status Exam:  Appearance: unremarkable, age appropriate  Behavior/Cooperation: normal, friendly and cooperative, eye contact normal  Speech: normal tone, normal rate, normal pitch, normal volume  Mood: "ok"  Affect: normal  Thought Process: normal and " logical  Thought Content: normal, no suicidality, no homicidality, delusions, or paranoia   Orientation: grossly intact  Memory: Grossly intact  Attention Span/Concentration: Normal  Insight: fair insight into depression and recent suicide attempt  Judgment: fair AEB realized need for continued hospitalization         NEUROLOGICAL EXAMINATION:     CRANIAL NERVES     CN III, IV, VI   Extraocular motions are normal.     Significant Labs:   No results found for this or any previous visit (from the past 48 hour(s)).   No results found for: PHENYTOIN, PHENOBARB, VALPROATE, CBMZ      Significant Imaging: I have reviewed all pertinent imaging results/findings within the past 24 hours.

## 2019-01-07 NOTE — PROGRESS NOTES
01/07/19 0900 01/07/19 1000 01/07/19 1100   [facility] Group Therapy   Group Name Community Reintegration Education Education   Specific Interventions Current Events Relapse Prevention Guided Imagery/Relaxation   Participation Level Active;Appropriate Active;Appropriate;Attentive Active;Appropriate   Participation Quality Cooperative;Social Cooperative;Social Cooperative;Social   Insight/Motivation Good Good Good   Affect/Mood Display Appropriate Appropriate Appropriate   Cognition Alert Alert Alert       01/07/19 1200   [facility] Group Therapy   Group Name Therapeutic Recreation   Specific Interventions Skilled Activity Crafts   Participation Level Active;Appropriate;Attentive   Participation Quality Cooperative   Insight/Motivation Good   Affect/Mood Display Appropriate   Cognition Alert

## 2019-01-08 PROCEDURE — 25000003 PHARM REV CODE 250: Performed by: PSYCHIATRY & NEUROLOGY

## 2019-01-08 PROCEDURE — 99232 PR SUBSEQUENT HOSPITAL CARE,LEVL II: ICD-10-PCS | Mod: ,,, | Performed by: PSYCHIATRY & NEUROLOGY

## 2019-01-08 PROCEDURE — 99232 SBSQ HOSP IP/OBS MODERATE 35: CPT | Mod: ,,, | Performed by: PSYCHIATRY & NEUROLOGY

## 2019-01-08 PROCEDURE — 12400001 HC PSYCH SEMI-PRIVATE ROOM

## 2019-01-08 RX ADMIN — LAMOTRIGINE 25 MG: 25 TABLET ORAL at 09:01

## 2019-01-08 RX ADMIN — SERTRALINE HYDROCHLORIDE 100 MG: 100 TABLET ORAL at 09:01

## 2019-01-08 RX ADMIN — Medication 1 TABLET: at 09:01

## 2019-01-08 NOTE — PLAN OF CARE
Pt displayed a flat affect. Calm, pleasant, and cooperative. Attended unit activities and was medication compliant. Denies SI/HI. Denied AH/VH. No physical complaints voiced. NAD observed. Will cont to monitor.

## 2019-01-08 NOTE — PLAN OF CARE
SW received call from pt mother by mistake, she indicated she thought SW was the person she spoke with . KENIA provided correct person's name NICK Rodriguez and sent e-mail to make contact.    Signing off
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
## 2019-01-08 NOTE — PROGRESS NOTES
01/08/19 0900 01/08/19 1000 01/08/19 1100   [facility] Therapy   Group Name Community Reintegration Mental Awareness Education   Specific Interventions Current Events Cognitive Stimulation Training Guided Imagery/Relaxation   Participation Level Active;Appropriate Active;Appropriate Active;Appropriate   Participation Quality Cooperative;Social Cooperative;Social Cooperative;Social   Insight/Motivation Good Good Good   Affect/Mood Display Appropriate Appropriate Appropriate   Cognition Alert Alert Alert       01/08/19 1300   [facility]   Group Name Therapeutic Recreation   Specific Interventions Skilled Activity Crafts   Participation Level --    Participation Quality Refused;Lack of Interest   Insight/Motivation --    Affect/Mood Display --    Cognition --

## 2019-01-08 NOTE — ASSESSMENT & PLAN NOTE
Patient with worsening depression for the past couple months, resulting in 4 suicide attempts via overdose and cutting. Attributes depression due to stressors with school, family expectations, and work. Previous trial of Zoloft but was not on medication long enough to assess its efficacy. Reports depressive symptoms of increased sleep, poor appetite, poor concentration, guilt, hopelessness.       Zoloft 50mg po daily started on 1/5, increased to 100mg for 1/6  Zyprexa 10mg PO/IM Q8H PRN for non-redirectable agitation  Vistaril 50mg PO Q6H PRN for anxiety/insomnia  Folic acid    -some concern for possible Bipolar 2  disorder by history- continue lamictal 25 mg

## 2019-01-08 NOTE — ASSESSMENT & PLAN NOTE
Patient with worsening depression for the past couple months, resulting in 4 suicide attempts via overdose and cutting. Attributes depression due to stressors with school, family expectations, and work. Previous trial of Zoloft but was not on medication long enough to assess its efficacy. Reports depressive symptoms of increased sleep, poor appetite, poor concentration, guilt, hopelessness.       Zoloft 50mg po daily started on 1/5, increased to 100mg for 1/6  Zyprexa 10mg PO/IM Q8H PRN for non-redirectable agitation  Vistaril 50mg PO Q6H PRN for anxiety/insomnia  Folic acid    -some concern for possible Bipolar 2  disorder by history- will start lamictal 25 mg

## 2019-01-08 NOTE — PROGRESS NOTES
01/07/19 2000   [facility] Group Therapy   Group Name Mental Awareness   Specific Interventions Skilled Activity Self-Expression   Participation Level Active;Appropriate   Participation Quality Cooperative   Insight/Motivation Good   Affect/Mood Display Appropriate;Bright   Cognition Alert;Oriented
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
## 2019-01-08 NOTE — SUBJECTIVE & OBJECTIVE
"Interval History: see hospital course    Family History     Problem Relation (Age of Onset)    Cancer Paternal Grandmother    Diabetes Maternal Grandmother    Hypertension Father, Mother        Tobacco Use    Smoking status: Never Smoker    Smokeless tobacco: Never Used   Substance and Sexual Activity    Alcohol use: Yes     Comment: Occasionally    Drug use: No    Sexual activity: Yes     Partners: Male     Birth control/protection: Injection     Psychotherapeutics (From admission, onward)    Start     Stop Route Frequency Ordered    01/07/19 0900  sertraline tablet 100 mg      -- Oral Daily 01/06/19 0936    01/04/19 2355  OLANZapine tablet 10 mg      -- Oral Every 8 hours PRN 01/04/19 2255    01/04/19 2355  OLANZapine injection 10 mg      06/02 1555 IM Once as needed 01/04/19 2255           Review of Systems   Respiratory: Negative for shortness of breath.    Cardiovascular: Negative for chest pain.   Gastrointestinal: Negative for abdominal pain, constipation and diarrhea.   Neurological: Negative for facial asymmetry.     Objective:     Vital Signs (Most Recent):  Temp: 98.5 °F (36.9 °C) (01/08/19 0800)  Pulse: 81 (01/08/19 0800)  Resp: 16 (01/08/19 0800)  BP: (!) 100/57 (01/08/19 0800) Vital Signs (24h Range):  Temp:  [98.4 °F (36.9 °C)-98.5 °F (36.9 °C)] 98.5 °F (36.9 °C)  Pulse:  [72-81] 81  Resp:  [16] 16  BP: (100-128)/(57-68) 100/57     Height: 5' 1" (154.9 cm)  Weight: 44.2 kg (97 lb 7.1 oz)  Body mass index is 18.41 kg/m².    No intake or output data in the 24 hours ending 01/08/19 1030    Physical Exam   Constitutional: She appears well-developed and well-nourished. No distress.   HENT:   Head: Normocephalic and atraumatic.   Eyes: EOM are normal.   Skin: She is not diaphoretic.   Psychiatric:   Mental Status Exam:  Appearance: unremarkable, age appropriate  Behavior/Cooperation: normal, friendly and cooperative, eye contact normal  Speech: normal tone, normal rate, normal pitch, normal " "volume  Mood: "ok"  Affect: normal  Thought Process: normal and logical  Thought Content: normal, no suicidality, no homicidality, delusions, or paranoia   Orientation: grossly intact  Memory: Grossly intact  Attention Span/Concentration: Normal  Insight: fair insight into depression and recent suicide attempt  Judgment: fair AEB realized need for continued hospitalization         NEUROLOGICAL EXAMINATION:     CRANIAL NERVES     CN III, IV, VI   Extraocular motions are normal.     Significant Labs:   No results found for this or any previous visit (from the past 48 hour(s)).   No results found for: PHENYTOIN, PHENOBARB, VALPROATE, CBMZ      Significant Imaging: I have reviewed all pertinent imaging results/findings within the past 24 hours.  "

## 2019-01-09 PROBLEM — F31.81 SEVERE DEPRESSED BIPOLAR II DISORDER WITHOUT PSYCHOTIC FEATURES: Status: ACTIVE | Noted: 2019-01-09

## 2019-01-09 LAB
HAV IGM SERPL QL IA: NEGATIVE
HBV CORE IGM SERPL QL IA: NEGATIVE
HBV SURFACE AG SERPL QL IA: NEGATIVE
HCV AB SERPL QL IA: NEGATIVE
HIV1+2 IGG SERPL QL IA.RAPID: NEGATIVE

## 2019-01-09 PROCEDURE — 86703 HIV-1/HIV-2 1 RESULT ANTBDY: CPT

## 2019-01-09 PROCEDURE — 90853 PR GROUP PSYCHOTHERAPY: ICD-10-PCS | Mod: ,,, | Performed by: PSYCHOLOGIST

## 2019-01-09 PROCEDURE — 99233 PR SUBSEQUENT HOSPITAL CARE,LEVL III: ICD-10-PCS | Mod: ,,, | Performed by: PSYCHIATRY & NEUROLOGY

## 2019-01-09 PROCEDURE — 36415 COLL VENOUS BLD VENIPUNCTURE: CPT

## 2019-01-09 PROCEDURE — 99233 SBSQ HOSP IP/OBS HIGH 50: CPT | Mod: ,,, | Performed by: PSYCHIATRY & NEUROLOGY

## 2019-01-09 PROCEDURE — 90853 GROUP PSYCHOTHERAPY: CPT | Mod: ,,, | Performed by: PSYCHOLOGIST

## 2019-01-09 PROCEDURE — 90834 PR PSYCHOTHERAPY W/PATIENT, 45 MIN: ICD-10-PCS | Mod: ,,, | Performed by: PSYCHOLOGIST

## 2019-01-09 PROCEDURE — 90834 PSYTX W PT 45 MINUTES: CPT | Mod: ,,, | Performed by: PSYCHOLOGIST

## 2019-01-09 PROCEDURE — 25000003 PHARM REV CODE 250: Performed by: PSYCHIATRY & NEUROLOGY

## 2019-01-09 PROCEDURE — 12400001 HC PSYCH SEMI-PRIVATE ROOM

## 2019-01-09 PROCEDURE — 80074 ACUTE HEPATITIS PANEL: CPT

## 2019-01-09 RX ORDER — MIRTAZAPINE 15 MG/1
15 TABLET, ORALLY DISINTEGRATING ORAL NIGHTLY
Status: DISCONTINUED | OUTPATIENT
Start: 2019-01-09 | End: 2019-01-10 | Stop reason: HOSPADM

## 2019-01-09 RX ADMIN — LAMOTRIGINE 25 MG: 25 TABLET ORAL at 08:01

## 2019-01-09 RX ADMIN — MIRTAZAPINE 15 MG: 15 TABLET, ORALLY DISINTEGRATING ORAL at 08:01

## 2019-01-09 RX ADMIN — SERTRALINE HYDROCHLORIDE 100 MG: 100 TABLET ORAL at 08:01

## 2019-01-09 RX ADMIN — Medication 1 TABLET: at 08:01

## 2019-01-09 NOTE — PLAN OF CARE
01/09/19 1600   [facility] Group Therapy   Group Name Medication   Participation Level Appropriate;Supportive;Attentive   Participation Quality Cooperative   Insight/Motivation Good   Affect/Mood Display Appropriate   Cognition Alert

## 2019-01-09 NOTE — ASSESSMENT & PLAN NOTE
Patient with worsening depression for the past couple months, resulting in 4 suicide attempts via overdose and cutting. Attributes depression due to stressors with school, family expectations, and work. Previous trial of Zoloft but was not on medication long enough to assess its efficacy. Reports depressive symptoms of increased sleep, poor appetite, poor concentration, guilt, hopelessness. By history it appears pt experienced hypomanic episode-will start lamictal 25 mg as a mood stabilizer    Lamictal 25 mg for mood stabilization. Will be titrated up by outpatient psych MD  Zoloft 50mg po daily started on 1/5, increased to 100mg for 1/6. Decreased to 75 mg starting 1/10  Remeron 15 mg for insomnia  Zyprexa 10mg PO/IM Q8H PRN for non-redirectable agitation  Vistaril 50mg PO Q6H PRN for anxiety/insomnia  Folic acid
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
## 2019-01-09 NOTE — PLAN OF CARE
Problem: Adult Behavioral Health Plan of Care  Goal: Patient-Specific Goal (Individualization)  Outcome: Ongoing (interventions implemented as appropriate)  Patient sitting up in activity room. Patient alert and oriented. Patient calm and cooperative. Patient denies any complaints of pain or discomfort. Safety maintained. Will continue to monitor.

## 2019-01-09 NOTE — PROGRESS NOTES
"[facility]  Psychology  Progress Note  Individual Psychotherapy (PhD/LCSW)    Patient Name: Jeri Galloway  MRN: [id number]    Patient Class: IP- Psych  Admission Date: 1/4/2019  Hospital Length of Stay: 5 days  Attending Physician: Geoff Merino Jr., MD  Primary Care Provider: Primary Doctor No    Therapeutic Intervention: Met with patient.  Outpatient - Insight oriented psychotherapy 45 min - CPT code 75966    Chief Complaint/Reason for Encounter: depression     Interval History and Content of Current Session: Pt referred for individual psychotherapy session by Dr Merino. Pt discussed multiple stressors in her current life that intensified her depression: school, childcare, work, finances, etc. Pt noted her ambition to make her fx proud and fulfill her ambition to become an M.D and to do it all on her own. Pt states she now realizes she needs the support of others (eg her father) and it is "ok to ask for help." She noted that in her fx no one had gotten a college degree. As she put it, she has been "determined not to become a statistic". She recognizes now that her fear of failure has been so intense that it was creating a "self-fulfilling prophecy" by over-extending herself and not getting the help she needed.  She acknowledged that she does, indeed, have a "Plan B" if she doesn't make it to medical school (eg becoming a nurse practitioner).     Risk Parameters:  Patient reports no suicidal ideation  Patient reports no homicidal ideation  Patient reports no self-injurious behavior  Patient reports no violent behavior    Verbal Deficits: None    Patient's response to intervention:  The patient's response to intervention is accepting.    Progress toward goals and other mental status changes:  The patient's progress toward goals is fair .    Diagnostic Impression - Plan:     Active Diagnoses:    Diagnosis Date Noted POA    PRINCIPAL PROBLEM:  Severe depressed bipolar II disorder without " psychotic features [F31.81] 01/09/2019 Yes    Severe episode of recurrent major depressive disorder, without psychotic features [F33.2]  Yes      Problems Resolved During this Admission:       Treatment Plan:  · Target symptoms: depression, mood disorder  · Why chosen therapy is appropriate versus another modality: relevant to diagnosis, patient responds to this modality  · Outcome monitoring methods: self-report, observation, chart notes   · Therapeutic intervention type: insight oriented psychotherapy    Plan:  APU    Return to Clinic: as scheduled    Length of Service (minutes): 45    Julius Schneider, PhD  Psychology  [facility]

## 2019-01-09 NOTE — PROGRESS NOTES
Group Psychotherapy (PhD/LCSW)    Site: [facility]    Clinical status of patient: Inpatient    Date: 1/9/2019    Group Focus: Life Skills    Length of service: 83720 - 35-40 minutes    Number of patients in attendance: 9    Referred by: Acute Psychiatry Unit Treatment Team    Target symptoms: Depression    Patient's response to treatment: Active Listening and Self-disclosure    Progress toward goals: Progressing slowly    Interval History: Pt appeared alert and attentive in group. Pt participated appropriately in a group discussion of self-fulfilling prophecies and how to avoid the way they can make negative consequences more likely to occur. .     Diagnosis: Major Depressive d/o, recurrent, severe, without psychotic features     Plan: Continue treatment on APU

## 2019-01-09 NOTE — SUBJECTIVE & OBJECTIVE
"Interval History: see hospital course    Family History     Problem Relation (Age of Onset)    Cancer Paternal Grandmother    Diabetes Maternal Grandmother    Hypertension Father, Mother        Tobacco Use    Smoking status: Never Smoker    Smokeless tobacco: Never Used   Substance and Sexual Activity    Alcohol use: Yes     Comment: Occasionally    Drug use: No    Sexual activity: Yes     Partners: Male     Birth control/protection: Injection     Psychotherapeutics (From admission, onward)    Start     Stop Route Frequency Ordered    01/10/19 0900  sertraline tablet 75 mg      -- Oral Daily 01/09/19 0919 01/04/19 2355  OLANZapine tablet 10 mg      -- Oral Every 8 hours PRN 01/04/19 2255 01/04/19 2355  OLANZapine injection 10 mg      06/02 1555 IM Once as needed 01/04/19 2255           Review of Systems   Respiratory: Negative for shortness of breath.    Cardiovascular: Negative for chest pain.   Gastrointestinal: Negative for abdominal pain, constipation and diarrhea.   Neurological: Negative for facial asymmetry.     Objective:     Vital Signs (Most Recent):  Temp: 97.8 °F (36.6 °C) (01/09/19 0744)  Pulse: 77 (01/09/19 0744)  Resp: 18 (01/09/19 0744)  BP: 113/63 (01/09/19 0744) Vital Signs (24h Range):  Temp:  [97.8 °F (36.6 °C)-98.9 °F (37.2 °C)] 97.8 °F (36.6 °C)  Pulse:  [63-77] 77  Resp:  [16-18] 18  BP: (113-121)/(63-69) 113/63     Height: 5' 1" (154.9 cm)  Weight: 45.3 kg (99 lb 13.9 oz)  Body mass index is 18.87 kg/m².    No intake or output data in the 24 hours ending 01/09/19 0919    Physical Exam   Constitutional: She appears well-developed and well-nourished. No distress.   HENT:   Head: Normocephalic and atraumatic.   Eyes: EOM are normal.   Skin: She is not diaphoretic.   Psychiatric:   Mental Status Exam:  Appearance: unremarkable, age appropriate  Behavior/Cooperation: normal, friendly and cooperative, eye contact normal  Speech: normal tone, normal rate, normal pitch, normal " "volume  Mood: "good"  Affect: full, reactive, mood congruent, appropriate  Thought Process: normal and logical  Thought Content: normal, no suicidality, no homicidality, delusions, or paranoia   Orientation: grossly intact  Memory: Grossly intact  Attention Span/Concentration: Normal  Insight: fair insight into depression and recent suicide attempt  Judgment: fair AEB realized need for continued hospitalization         NEUROLOGICAL EXAMINATION:     CRANIAL NERVES     CN III, IV, VI   Extraocular motions are normal.     Significant Labs:   Recent Results (from the past 48 hour(s))   Rapid HIV    Collection Time: 01/09/19  5:46 AM   Result Value Ref Range    HIV Rapid Testing Negative Negative      No results found for: PHENYTOIN, PHENOBARB, VALPROATE, CBMZ      Significant Imaging: I have reviewed all pertinent imaging results/findings within the past 24 hours.  "
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
## 2019-01-09 NOTE — PROGRESS NOTES
01/09/19 0900 01/09/19 1000 01/09/19 1100   [facility] Group Therapy   Group Name Community Reintegration Mental Awareness Education   Specific Interventions Current Events Cognitive Stimulation Training Guided Imagery/Relaxation   Participation Level Active;Appropriate Active;Appropriate;Attentive Active;Appropriate;Attentive   Participation Quality Cooperative Cooperative;Social Cooperative;Social   Insight/Motivation Good Good Good   Affect/Mood Display Appropriate Appropriate Appropriate   Cognition Alert Alert Alert       01/09/19 1300   [facility] Group Therapy   Group Name Therapeutic Recreation   Specific Interventions Skilled Activity Crafts   Participation Level Active;Appropriate   Participation Quality Cooperative;Social   Insight/Motivation Good   Affect/Mood Display Appropriate   Cognition Alert

## 2019-01-10 VITALS
SYSTOLIC BLOOD PRESSURE: 115 MMHG | DIASTOLIC BLOOD PRESSURE: 67 MMHG | HEART RATE: 84 BPM | RESPIRATION RATE: 17 BRPM | BODY MASS INDEX: 18.86 KG/M2 | TEMPERATURE: 98 F | WEIGHT: 99.88 LBS | HEIGHT: 61 IN

## 2019-01-10 PROBLEM — F31.81: Status: ACTIVE | Noted: 2019-01-10

## 2019-01-10 PROBLEM — F31.81 SEVERE DEPRESSED BIPOLAR II DISORDER WITHOUT PSYCHOTIC FEATURES: Chronic | Status: ACTIVE | Noted: 2019-01-09

## 2019-01-10 PROCEDURE — 99238 HOSP IP/OBS DSCHRG MGMT 30/<: CPT | Mod: ,,, | Performed by: PSYCHIATRY & NEUROLOGY

## 2019-01-10 PROCEDURE — 25000003 PHARM REV CODE 250: Performed by: PSYCHIATRY & NEUROLOGY

## 2019-01-10 PROCEDURE — 99238 PR HOSPITAL DISCHARGE DAY,<30 MIN: ICD-10-PCS | Mod: ,,, | Performed by: PSYCHIATRY & NEUROLOGY

## 2019-01-10 PROCEDURE — 90853 PR GROUP PSYCHOTHERAPY: ICD-10-PCS | Mod: ,,, | Performed by: PSYCHOLOGIST

## 2019-01-10 PROCEDURE — 90853 GROUP PSYCHOTHERAPY: CPT | Mod: ,,, | Performed by: PSYCHOLOGIST

## 2019-01-10 RX ORDER — SERTRALINE HYDROCHLORIDE 25 MG/1
75 TABLET, FILM COATED ORAL DAILY
Qty: 90 TABLET | Refills: 1 | Status: SHIPPED | OUTPATIENT
Start: 2019-01-11 | End: 2019-05-27

## 2019-01-10 RX ORDER — MIRTAZAPINE 15 MG/1
15 TABLET, ORALLY DISINTEGRATING ORAL NIGHTLY
Qty: 30 TABLET | Refills: 1 | Status: SHIPPED | OUTPATIENT
Start: 2019-01-10 | End: 2019-05-27

## 2019-01-10 RX ORDER — LAMOTRIGINE 25 MG/1
25 TABLET ORAL DAILY
Qty: 30 TABLET | Refills: 1 | Status: SHIPPED | OUTPATIENT
Start: 2019-01-11 | End: 2019-05-27

## 2019-01-10 RX ADMIN — LAMOTRIGINE 25 MG: 25 TABLET ORAL at 08:01

## 2019-01-10 RX ADMIN — Medication 1 TABLET: at 08:01

## 2019-01-10 RX ADMIN — SERTRALINE HYDROCHLORIDE 75 MG: 25 TABLET ORAL at 08:01
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
## 2019-01-10 NOTE — PROGRESS NOTES
Group Psychotherapy (PhD/LCSW)    Site: [facility]    Clinical status of patient: Inpatient    Date: 1/10/2019    Group Focus: Life Skills    Length of service: 09783 - 35-40 minutes    Number of patients in attendance: 6    Referred by: Acute Psychiatry Unit Treatment Team    Target symptoms: Depression    Patient's response to treatment: Active Listening and Self-disclosure    Progress toward goals: Progressing slowly    Interval History:Pt appeared alert and attentive in group. Pt participated briefly but appropriately when prompted in a discussion of strategies for overcoming dysfunctional patterns in relationship by focusing on changing one's own part in the dynamic instead of trying to change the other person's behavior.    Diagnosis: Bipolar II d/o    Plan: See Discharge Summary dated 1/10/19

## 2019-01-10 NOTE — DISCHARGE INSTRUCTIONS
"Please apply online for programs.    Go to [place]gov and click on the green letters that say "I am".    A drop box will appear with a list of services you can apply for. If you have any questions please call [facility]. [phone].    For  assistance programs you can go to department of education.  "

## 2019-01-10 NOTE — DISCHARGE SUMMARY
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 lives in [place]. Her aunt lives in the area and provides "words of encouragement" but pt does not appear to have a reliable support system. SWer will attempt to obtain resources that are targeted towards college aged people. The father of her child has been helping the pt minimally and does not help her financially, his mother will occasionally watch her baby.     01/09/2019  Patient has been participating appropriately and is calm, cooperative and pleasant. She was informed that that Dr. Schneider has time today to speak with her 1:1. She states that she had a lot of visitors yesterday which was a "surprise" and they expressed their availability if she needs help. Pt states that her mother who lives in [place] will come and stay with her in [facility] "for as long as I need". Informed about resources available through her school; will need psychiatrist to manage medications. She describes her mood as "happy" and displays a full reactive affect in treatment team. Discussed suspected dx of bipolar II disorder and importance of adequate sleep and  current medications - zoloft and lamictal. She endorses trouble sleeping which she " "attributes to being on the unit. Will decrease zoloft to 75 mg as she feels that it is activating and trial remeron 15 mg for sleep tonight.     01/10/2019  Patient seen in treatment team this AM, she is calm, cooperative, and pleasant and displays a bright reactive affect. She states her mood is "good" and she expresses that she feels safe to go home. Discussed outpatient follow up. Patient slept well with remeron.     * No surgery found *     Consults:   Physical Exam   Constitutional: She appears well-developed and well-nourished. No distress.   HENT:   Head: Normocephalic.   Eyes: EOM are normal.   Psychiatric:   Mental Status Exam:  Appearance: unremarkable, age appropriate  Behavior/Cooperation: normal, cooperative, friendly and cooperative  Speech: normal tone, normal rate, normal pitch, normal volume  Mood: "good"  Affect: bright  Thought Process: normal and logical  Thought Content: normal, no suicidality, no homicidality, delusions, or paranoia   Perception: No objective evidence of hallucinations.   Orientation: grossly intact  Memory: Grossly intact  Attention Span/Concentration: Normal  Insight: good  Judgment: good       NEUROLOGICAL EXAMINATION:     CRANIAL NERVES     CN III, IV, VI   Extraocular motions are normal.     Significant Labs:   Last 24 Hours:   Recent Lab Results     None          Significant Imaging: None    Smoking Cessation:   Does the patient smoke? No  Does the patient want a prescription for Smoking Cessation? No  Does the patient want counseling for Smoking Cessation? No         Pending Diagnostic Studies:     None        Final Active Diagnoses:    Diagnosis Date Noted POA    PRINCIPAL PROBLEM:  Bipolar II disorder, severe, depressed, with anxious distress, in partial remission [F31.81] 01/10/2019 No      Problems Resolved During this Admission:      No new Assessment & Plan notes have been filed under this hospital service since the last note was generated.  Service: " Psychiatry      Functional Condition: Independent ambulation    Discharged Condition: good    Disposition: home    Follow Up:  Follow-up Information     Schedule an appointment as soon as possible for a visit with [facility].    Why:  for psychiatric follow up. Please call [phone]. Someone from facility will call you today and do a phone interview. Once this is done, they will schedule an appointment for you.  Contact information:  [address] [place] [address]  [phone]                 Patient Instructions:   No discharge procedures on file.  Medications:  Reconciled Home Medications:      Medication List      START taking these medications    folic acid-vit B6-vit B12 2.5-25-2 mg 2.5-25-2 mg Tab  Commonly known as:  FOLBIC or Equiv  Take 1 tablet by mouth once daily.  Start taking on:  1/11/2019     lamoTRIgine 25 MG tablet  Commonly known as:  LAMICTAL  Take 1 tablet (25 mg total) by mouth once daily.  Start taking on:  1/11/2019     mirtazapine 15 MG disintegrating tablet  Commonly known as:  REMERON SOL-TAB  Take 1 tablet (15 mg total) by mouth nightly.     sertraline 25 MG tablet  Commonly known as:  ZOLOFT  Take 3 tablets (75 mg total) by mouth once daily.  Start taking on:  1/11/2019        CONTINUE taking these medications    medroxyPROGESTERone 150 mg/mL Syrg  Commonly known as:  DEPO-PROVERA  Inject 1 mL (150 mg total) into the muscle every 3 (three) months.        STOP taking these medications    clotrimazole-betamethasone 1-0.05% cream  Commonly known as:  LOTRISONE     diclofenac 50 MG EC tablet  Commonly known as:  VOLTAREN     fluconazole 200 MG Tab  Commonly known as:  DIFLUCAN          Is patient being discharged on multiple antipsychotics? No        Total time:30 with greater than 50% of this time spent in counseling and/or coordination of care.     All elements of the transition record were discussed with the patient at discharge and patient agrees to this  plan.  Patient was provided a return to school note.    Maddy Stoll MD  Psychiatry  [facility]

## 2019-10-17 ENCOUNTER — OFFICE VISIT (OUTPATIENT)
Dept: PRIMARY CARE CLINIC | Facility: CLINIC | Age: 24
End: 2019-10-17
Payer: MEDICAID

## 2019-10-17 VITALS
DIASTOLIC BLOOD PRESSURE: 54 MMHG | HEIGHT: 61 IN | RESPIRATION RATE: 18 BRPM | BODY MASS INDEX: 19.09 KG/M2 | HEART RATE: 101 BPM | OXYGEN SATURATION: 97 % | WEIGHT: 101.13 LBS | TEMPERATURE: 99 F | SYSTOLIC BLOOD PRESSURE: 90 MMHG

## 2019-10-17 DIAGNOSIS — Z23 ENCOUNTER FOR VACCINATION: ICD-10-CM

## 2019-10-17 DIAGNOSIS — Z01.419 ENCOUNTER FOR GYNECOLOGICAL EXAMINATION WITHOUT ABNORMAL FINDING: ICD-10-CM

## 2019-10-17 DIAGNOSIS — F31.81 BIPOLAR II DISORDER, SEVERE, DEPRESSED, WITH ANXIOUS DISTRESS, IN PARTIAL REMISSION: Primary | ICD-10-CM

## 2019-10-17 DIAGNOSIS — Z13.220 ENCOUNTER FOR LIPID SCREENING FOR CARDIOVASCULAR DISEASE: ICD-10-CM

## 2019-10-17 DIAGNOSIS — Z00.00 ANNUAL PHYSICAL EXAM: ICD-10-CM

## 2019-10-17 DIAGNOSIS — Z13.6 ENCOUNTER FOR LIPID SCREENING FOR CARDIOVASCULAR DISEASE: ICD-10-CM

## 2019-10-17 PROCEDURE — 99214 OFFICE O/P EST MOD 30 MIN: CPT | Mod: PBBFAC,PN | Performed by: INTERNAL MEDICINE

## 2019-10-17 PROCEDURE — 99213 PR OFFICE/OUTPT VISIT, EST, LEVL III, 20-29 MIN: ICD-10-PCS | Mod: S$PBB,,, | Performed by: INTERNAL MEDICINE

## 2019-10-17 PROCEDURE — 99999 PR PBB SHADOW E&M-EST. PATIENT-LVL IV: ICD-10-PCS | Mod: PBBFAC,,, | Performed by: INTERNAL MEDICINE

## 2019-10-17 PROCEDURE — 99999 PR PBB SHADOW E&M-EST. PATIENT-LVL IV: CPT | Mod: PBBFAC,,, | Performed by: INTERNAL MEDICINE

## 2019-10-17 PROCEDURE — 99213 OFFICE O/P EST LOW 20 MIN: CPT | Mod: S$PBB,,, | Performed by: INTERNAL MEDICINE

## 2019-10-17 RX ORDER — LAMOTRIGINE 25 MG/1
25 TABLET ORAL DAILY
COMMUNITY
Start: 2019-01-11 | End: 2019-10-17 | Stop reason: SDUPTHER

## 2019-10-17 RX ORDER — QUETIAPINE FUMARATE 25 MG/1
75 TABLET, FILM COATED ORAL DAILY
Qty: 90 TABLET | Refills: 5 | Status: SHIPPED | OUTPATIENT
Start: 2019-10-17 | End: 2022-10-06

## 2019-10-17 RX ORDER — SERTRALINE HYDROCHLORIDE 25 MG/1
75 TABLET, FILM COATED ORAL DAILY
COMMUNITY
Start: 2019-03-04 | End: 2020-01-24

## 2019-10-17 RX ORDER — LAMOTRIGINE 25 MG/1
25 TABLET ORAL DAILY
Qty: 30 TABLET | Refills: 5 | Status: SHIPPED | OUTPATIENT
Start: 2019-10-17 | End: 2022-10-06

## 2019-10-17 NOTE — LETTER
October 17, 2019      [facility]  [address], [facility] [address]  [facility] [address]  Phone: [phone]  Fax: [phone]       Patient: Jeri Galloway   YOB: 1995  Date of Visit: 10/17/2019    To Whom It May Concern:    Michele Galloway  was at [facility] on 10/17/2019. She may return to work/school on 10/21/2019 with no restrictions. If you have any questions or concerns, or if I can be of further assistance, please do not hesitate to contact me.    Sincerely,    Bianca Oconnor LPN

## 2019-10-17 NOTE — PROGRESS NOTES
Subjective:       Patient ID: Jeri Galloway is a 24 y.o. female.    Chief Complaint: Manic Behavior (untreated since March)    HPI  patient with history of bipolar disorder has been out of medication had not seen psychiatrist since March of this year since the she lost her insurance patient currently having a manic attack cannot go to school patient in the pre med program miss school for 7 days and not concentrate to study patient lives with boyfriend and has 1 child 3 years ago patient deny any suicidal thoughts or ideation cc oriented intelligent the combination of Lamictal and Seroquel work well for her before request to refill these until she by psychiatrist she is not pregnant and does not plan to get pregnant she deny any other medical history and no physical complain patient deny weight gain weight loss short of breath chest pain dyspnea with exertion  PMH anxiety depression bipolar disorder UTI  PSH patient deny any past surgical history  SH:  Patient deny smoking or EtOH or any drug use  F/H anxiety depression  Review of Systems   Constitutional: Negative for activity change, fatigue and unexpected weight change.   HENT: Negative for congestion, dental problem, nosebleeds and rhinorrhea.    Eyes: Negative for visual disturbance.   Respiratory: Negative for shortness of breath and wheezing.    Cardiovascular: Negative for chest pain and palpitations.   Gastrointestinal: Negative for constipation, diarrhea and nausea.   Genitourinary: Negative for difficulty urinating, dysuria and hematuria.   Musculoskeletal: Negative for arthralgias and myalgias.   Skin: Negative for rash.   Neurological: Negative for weakness and headaches.   Psychiatric/Behavioral: Negative for behavioral problems and dysphoric mood. The patient is not nervous/anxious.        Objective:      Physical Exam   Constitutional: She is oriented to person, place, and time. She appears well-developed and well-nourished. No distress.    HENT:   Head: Normocephalic and atraumatic.   Right Ear: External ear normal.   Left Ear: External ear normal.   Nose: Nose normal.   Mouth/Throat: Oropharynx is clear and moist. No oropharyngeal exudate.   Eyes: Pupils are equal, round, and reactive to light. Conjunctivae and EOM are normal. Right eye exhibits no discharge. Left eye exhibits no discharge.   Neck: Normal range of motion. Neck supple. No thyromegaly present.   Cardiovascular: Normal rate, regular rhythm, normal heart sounds and intact distal pulses. Exam reveals no gallop and no friction rub.   No murmur heard.  Pulmonary/Chest: Effort normal and breath sounds normal. No respiratory distress. She has no wheezes. She has no rales. She exhibits no tenderness.   Abdominal: Soft. Bowel sounds are normal. She exhibits no distension. There is no tenderness. There is no rebound and no guarding.   Musculoskeletal: Normal range of motion. She exhibits no edema, tenderness or deformity.   Lymphadenopathy:     She has no cervical adenopathy.   Neurological: She is alert and oriented to person, place, and time.   Skin: Skin is warm and dry. Capillary refill takes less than 2 seconds. No rash noted. No erythema.   Psychiatric: She has a normal mood and affect. Judgment and thought content normal.   Nursing note and vitals reviewed.      Assessment:       1. Bipolar II disorder, severe, depressed, with anxious distress, in partial remission    2. Encounter for vaccination    3. Encounter for lipid screening for cardiovascular disease    4. Encounter for gynecological examination without abnormal finding    5. Annual physical exam        Plan:       Bipolar II disorder, severe, depressed, with anxious distress, in partial remission  -     lamoTRIgine (LAMICTAL) 25 MG tablet; Take 1 tablet (25 mg total) by mouth once daily.  Dispense: 30 tablet; Refill: 5  -     QUEtiapine (SEROQUEL) 25 MG Tab; Take 3 tablets (75 mg total) by mouth once daily.  Dispense: 90  tablet; Refill: 5  -     TSH; Future; Expected date: 10/17/2019  -     T4, free; Future; Expected date: 10/17/2019  -     Ambulatory Referral to Psychiatry    Encounter for vaccination  -     Influenza - Quadrivalent (6 months+) (PF)    Encounter for lipid screening for cardiovascular disease  -     Lipid panel; Future; Expected date: 10/17/2019    Encounter for gynecological examination without abnormal finding  -     Ambulatory referral to Obstetrics / Gynecology    Annual physical exam  -     CBC auto differential; Future; Expected date: 10/17/2019  -     Comprehensive metabolic panel; Future; Expected date: 10/17/2019

## 2019-10-17 NOTE — LETTER
October 17, 2019      [facility]  [address], [address] [address]  [facility] [address]  Phone: [phone]  Fax: [phone]       Patient: Jeri Galloway   YOB: 1995  Date of Visit: 10/17/2019    To Whom It May Concern:    Michele Galloway  was at [facility] on 10/17/2019.Please excuse patient for the following days 10/14-10/21. She may return to work/school on 10/21/2019 with no restrictions. If you have any questions or concerns, or if I can be of further assistance, please do not hesitate to contact me.    Sincerely,    Sophie Brumfield MA

## 2019-10-25 ENCOUNTER — PATIENT MESSAGE (OUTPATIENT)
Dept: OBSTETRICS AND GYNECOLOGY | Facility: CLINIC | Age: 24
End: 2019-10-25

## 2019-10-25 ENCOUNTER — OFFICE VISIT (OUTPATIENT)
Dept: OBSTETRICS AND GYNECOLOGY | Facility: CLINIC | Age: 24
End: 2019-10-25
Payer: MEDICAID

## 2019-10-25 VITALS
DIASTOLIC BLOOD PRESSURE: 78 MMHG | BODY MASS INDEX: 19.4 KG/M2 | SYSTOLIC BLOOD PRESSURE: 115 MMHG | HEIGHT: 61 IN | WEIGHT: 102.75 LBS

## 2019-10-25 DIAGNOSIS — N93.9 VAGINAL BLEEDING: Primary | ICD-10-CM

## 2019-10-25 DIAGNOSIS — Z11.3 SCREEN FOR STD (SEXUALLY TRANSMITTED DISEASE): ICD-10-CM

## 2019-10-25 DIAGNOSIS — Z12.4 SCREENING FOR CERVICAL CANCER: ICD-10-CM

## 2019-10-25 PROCEDURE — 99999 PR PBB SHADOW E&M-EST. PATIENT-LVL III: ICD-10-PCS | Mod: PBBFAC,,, | Performed by: OBSTETRICS & GYNECOLOGY

## 2019-10-25 PROCEDURE — 87491 CHLMYD TRACH DNA AMP PROBE: CPT

## 2019-10-25 PROCEDURE — 99212 PR OFFICE/OUTPT VISIT, EST, LEVL II, 10-19 MIN: ICD-10-PCS | Mod: S$PBB,,, | Performed by: OBSTETRICS & GYNECOLOGY

## 2019-10-25 PROCEDURE — 99212 OFFICE O/P EST SF 10 MIN: CPT | Mod: S$PBB,,, | Performed by: OBSTETRICS & GYNECOLOGY

## 2019-10-25 PROCEDURE — 99999 PR PBB SHADOW E&M-EST. PATIENT-LVL III: CPT | Mod: PBBFAC,,, | Performed by: OBSTETRICS & GYNECOLOGY

## 2019-10-25 PROCEDURE — 88175 CYTOPATH C/V AUTO FLUID REDO: CPT

## 2019-10-25 PROCEDURE — 99213 OFFICE O/P EST LOW 20 MIN: CPT | Mod: PBBFAC | Performed by: OBSTETRICS & GYNECOLOGY

## 2019-10-25 NOTE — PROGRESS NOTES
Subjective:       Patient ID: Jeri Galloway is a 24 y.o. female.    Chief Complaint:  Possible Pregnancy      History of Present Illness  HPI    Subjective:      Jeri Galloway is a 24 y.o. female. Jeri reports bleeding since earlier this week. She is not in acute distress. Ectopic risks: none.    Cycle length: regular .  Pregnancy testing: Positive yesterday. UPT in office today is negative..  Pregnancy imaging: not done.  Blood type: pending.  Other lab results: none.    The following portions of the patient's history were reviewed and updated as appropriate: past family history, past medical history, past social history and problem list.    GYN & OB History  Patient's last menstrual period was 10/24/2019 (exact date).   Date of Last Pap: No result found    OB History    Para Term  AB Living   1         1   SAB TAB Ectopic Multiple Live Births                  # Outcome Date GA Lbr Francois/2nd Weight Sex Delivery Anes PTL Lv   1                 Review of Systems  Review of Systems        Objective:    Physical Exam:   Constitutional: She is oriented to person, place, and time. She appears well-developed.    HENT:   Head: Normocephalic and atraumatic.    Eyes: EOM are normal.     Cardiovascular: Normal rate.     Pulmonary/Chest: Effort normal.        Abdominal: Soft. There is no tenderness.     Genitourinary: Vagina normal and uterus normal. Pelvic exam was performed with patient prone. There is no rash, tenderness or lesion on the right labia. There is no rash, tenderness or lesion on the left labia. Uterus is not tender. Cervix is normal. Right adnexum displays no tenderness and no fullness. Left adnexum displays no tenderness and no fullness. No erythema or bleeding in the vagina. No vaginal discharge found. Labial bartholins normal.Cervix exhibits no motion tenderness.           Musculoskeletal: Normal range of motion.      Lymphadenopathy:        Right: No inguinal  adenopathy present.        Left: No inguinal adenopathy present.    Neurological: She is oriented to person, place, and time.    Skin: Skin is warm.    Psychiatric: She has a normal mood and affect.              Imaging   Limited office ultrasound: No IUP visualized       Assessment:        1. Vaginal bleeding    2. Screening for cervical cancer    3. Screen for STD (sexually transmitted disease)                Plan:       Blood type and Rh: pending.  Quantitative hCG today and again in 48 hours.  Transvaginal ultrasound when hCG 1,500 or sooner if symptoms of ectopic pregnancy.  Warning signs discussed: to call for increased bleeding, abdominal or shoulder pain, light headedness, or if she has any concerns.       1. Screening for cervical cancer  - Liquid-based pap smear, screening    2. Screen for STD (sexually transmitted disease)  - C. trachomatis/N. gonorrhoeae by AMP DNA [facility]; Cervicovaginal    3. Vaginal bleeding  - CBC auto differential; Future  - ABO/Rh; Future  - hCG, quantitative; Future

## 2019-10-25 NOTE — LETTER
October 25, 2019      [facility]  [address]  [place] [address]  Phone: [phone]       Patient: Jeri Galloway   YOB: 1995  Date of Visit: 10/25/2019    To Whom It May Concern:    Michele Galloway  was at [facility] on 10/25/2019. She may return to work/school on 10/28/19 with no restrictions. If you have any questions or concerns, or if I can be of further assistance, please do not hesitate to contact me.    Sincerely,    Dr. Lorna Johnson

## 2019-10-26 ENCOUNTER — PATIENT MESSAGE (OUTPATIENT)
Dept: OBSTETRICS AND GYNECOLOGY | Facility: CLINIC | Age: 24
End: 2019-10-26

## 2019-10-28 LAB
C TRACH DNA SPEC QL NAA+PROBE: NOT DETECTED
N GONORRHOEA DNA SPEC QL NAA+PROBE: NOT DETECTED

## 2019-11-04 ENCOUNTER — PATIENT MESSAGE (OUTPATIENT)
Dept: OBSTETRICS AND GYNECOLOGY | Facility: CLINIC | Age: 24
End: 2019-11-04

## 2020-01-23 ENCOUNTER — TELEPHONE (OUTPATIENT)
Dept: PRIMARY CARE CLINIC | Facility: CLINIC | Age: 25
End: 2020-01-23

## 2020-01-23 NOTE — TELEPHONE ENCOUNTER
Attempted to contact pt to see if she wanted to see GYN that we have now in our location to have pap rather than pcp.  Left vm to call back.

## 2020-01-24 ENCOUNTER — TELEPHONE (OUTPATIENT)
Dept: PRIMARY CARE CLINIC | Facility: CLINIC | Age: 25
End: 2020-01-24

## 2020-01-24 ENCOUNTER — OFFICE VISIT (OUTPATIENT)
Dept: PRIMARY CARE CLINIC | Facility: CLINIC | Age: 25
End: 2020-01-24
Payer: MEDICAID

## 2020-01-24 ENCOUNTER — CLINICAL SUPPORT (OUTPATIENT)
Dept: PRIMARY CARE CLINIC | Facility: CLINIC | Age: 25
End: 2020-01-24
Payer: MEDICAID

## 2020-01-24 VITALS
TEMPERATURE: 99 F | BODY MASS INDEX: 19.31 KG/M2 | OXYGEN SATURATION: 100 % | WEIGHT: 104.94 LBS | DIASTOLIC BLOOD PRESSURE: 48 MMHG | RESPIRATION RATE: 18 BRPM | HEIGHT: 62 IN | HEART RATE: 79 BPM | SYSTOLIC BLOOD PRESSURE: 90 MMHG

## 2020-01-24 DIAGNOSIS — Z20.2 POSSIBLE EXPOSURE TO STD: Primary | ICD-10-CM

## 2020-01-24 DIAGNOSIS — N94.6 MENSTRUAL CRAMP: ICD-10-CM

## 2020-01-24 DIAGNOSIS — L91.0 KELOID: ICD-10-CM

## 2020-01-24 PROCEDURE — 99214 OFFICE O/P EST MOD 30 MIN: CPT | Mod: PBBFAC,PN | Performed by: INTERNAL MEDICINE

## 2020-01-24 PROCEDURE — 99213 PR OFFICE/OUTPT VISIT, EST, LEVL III, 20-29 MIN: ICD-10-PCS | Mod: S$PBB,,, | Performed by: INTERNAL MEDICINE

## 2020-01-24 PROCEDURE — 99211 OFF/OP EST MAY X REQ PHY/QHP: CPT | Mod: PBBFAC,27,PN

## 2020-01-24 PROCEDURE — 99999 PR PBB SHADOW E&M-EST. PATIENT-LVL I: ICD-10-PCS | Mod: PBBFAC,,,

## 2020-01-24 PROCEDURE — 99999 PR PBB SHADOW E&M-EST. PATIENT-LVL I: CPT | Mod: PBBFAC,,,

## 2020-01-24 PROCEDURE — 99999 PR PBB SHADOW E&M-EST. PATIENT-LVL IV: CPT | Mod: PBBFAC,,, | Performed by: INTERNAL MEDICINE

## 2020-01-24 PROCEDURE — 99213 OFFICE O/P EST LOW 20 MIN: CPT | Mod: S$PBB,,, | Performed by: INTERNAL MEDICINE

## 2020-01-24 PROCEDURE — 99999 PR PBB SHADOW E&M-EST. PATIENT-LVL IV: ICD-10-PCS | Mod: PBBFAC,,, | Performed by: INTERNAL MEDICINE

## 2020-01-24 PROCEDURE — 87491 CHLMYD TRACH DNA AMP PROBE: CPT

## 2020-01-24 RX ORDER — NAPROXEN SODIUM 550 MG/1
550 TABLET ORAL 2 TIMES DAILY PRN
Qty: 30 TABLET | Refills: 1 | Status: SHIPPED | OUTPATIENT
Start: 2020-01-24 | End: 2020-06-04

## 2020-01-24 NOTE — LETTER
January 24, 2020      [facility]  [address]  [facility] [address]  Phone: [phone]  Fax: [phone]       Patient: Jeri Galloway   YOB: 1995  Date of Visit: 01/24/2020    To Whom It May Concern:    Michele Galloway  was at [facility] on 01/24/2020. She may return to work/school on 01/27/2020 with no restrictions. If you have any questions or concerns, or if I can be of further assistance, please do not hesitate to contact me.    Sincerely,        Wanda Sarmiento MA

## 2020-01-24 NOTE — TELEPHONE ENCOUNTER
----- Message from Anna Ellington sent at 1/24/2020 10:33 AM CST -----  Contact: patient  Please call above patient at [phone] returning call to the nurse thanks.

## 2020-01-25 NOTE — PROGRESS NOTES
Subjective:       Patient ID: Jeri Galloway is a 24 y.o. female.    Chief Complaint: Keloid (on ear); referral to gyno; and std screening    HPI   patient is here request had Pap smear but review medical record she had Pap smear in October last year only 3 months ago and was normal also have gonorrhea chlamydia checked negative patient watery about STD request to be check again she have no vaginal discharge or itching or any symptom no dysuria she does have a menstrual g every month during her the menstruation taking ibuprofen not helping she deny pregnancy she also have keloid  on her right ear it is small but want to be taking care of it growing from her right ear bonilla she deny any other physical symptom denies anxiety depression and 100% not pregnant  Review of Systems    Objective:      Physical Exam   Constitutional: She is oriented to person, place, and time. She appears well-developed and well-nourished. No distress.   HENT:   Head: Normocephalic and atraumatic.   Right Ear: External ear normal.   Left Ear: External ear normal.   Nose: Nose normal.   Mouth/Throat: Oropharynx is clear and moist. No oropharyngeal exudate.   Eyes: Pupils are equal, round, and reactive to light. Conjunctivae and EOM are normal. Right eye exhibits no discharge. Left eye exhibits no discharge.   Neck: Normal range of motion. Neck supple. No thyromegaly present.   Cardiovascular: Normal rate, regular rhythm, normal heart sounds and intact distal pulses. Exam reveals no gallop and no friction rub.   No murmur heard.  Pulmonary/Chest: Effort normal and breath sounds normal. No respiratory distress. She has no wheezes. She has no rales. She exhibits no tenderness.   Abdominal: Soft. Bowel sounds are normal. She exhibits no distension. There is no tenderness. There is no rebound and no guarding.   Musculoskeletal: Normal range of motion. She exhibits no edema, tenderness or deformity.   Lymphadenopathy:     She has no cervical  adenopathy.   Neurological: She is alert and oriented to person, place, and time.   Skin: Skin is warm and dry. Capillary refill takes less than 2 seconds. No rash noted. No erythema.   Psychiatric: She has a normal mood and affect. Judgment and thought content normal.   Nursing note and vitals reviewed.      Assessment:       1. Possible exposure to STD    2. Keloid    3. Menstrual cramp        Plan:       Possible exposure to STD  -     Cancel: C. trachomatis/N. gonorrhoeae by AMP DNA [facility]; Urine; Future; Expected date: 01/24/2020    Keloid               A small keloid growth from the a rt ear aileen a nontender no bleeding  -     Ambulatory referral to Dermatology    Menstrual cramp              Not better with ibuprofen will try Naprosyn  -     naproxen sodium (ANAPROX DS) 550 MG tablet; Take 1 tablet (550 mg total) by mouth 2 (two) times daily as needed (menstrual cramp).  Dispense: 30 tablet; Refill: 1

## 2020-01-27 LAB
C TRACH DNA SPEC QL NAA+PROBE: NOT DETECTED
N GONORRHOEA DNA SPEC QL NAA+PROBE: NOT DETECTED

## 2020-01-28 ENCOUNTER — TELEPHONE (OUTPATIENT)
Dept: PRIMARY CARE CLINIC | Facility: CLINIC | Age: 25
End: 2020-01-28

## 2020-01-28 ENCOUNTER — PATIENT MESSAGE (OUTPATIENT)
Dept: PRIMARY CARE CLINIC | Facility: CLINIC | Age: 25
End: 2020-01-28

## 2020-01-28 NOTE — TELEPHONE ENCOUNTER
----- Message from Rosa Bob sent at 1/28/2020  9:25 AM CST -----  Contact: pt [phone]  Pt is returning call for her results

## 2020-01-28 NOTE — TELEPHONE ENCOUNTER
Left vm to return call    ----- Message from Yoly Rodrigues sent at 1/28/2020 10:53 AM CST -----  Contact: Patient [phone]  Patient is returning a phone call.  Who left a message for the patient: moe LAWTON  Does patient know what this is regarding:  Test results  Comments:

## 2020-06-30 ENCOUNTER — OFFICE VISIT (OUTPATIENT)
Dept: OTOLARYNGOLOGY | Facility: CLINIC | Age: 25
End: 2020-06-30
Payer: MEDICAID

## 2020-06-30 VITALS
SYSTOLIC BLOOD PRESSURE: 101 MMHG | TEMPERATURE: 98 F | BODY MASS INDEX: 18.78 KG/M2 | WEIGHT: 102.69 LBS | HEART RATE: 83 BPM | DIASTOLIC BLOOD PRESSURE: 68 MMHG

## 2020-06-30 DIAGNOSIS — R41.3 MEMORY LOSS: ICD-10-CM

## 2020-06-30 DIAGNOSIS — S00.83XA CONTUSION OF TEMPLE REGION, INITIAL ENCOUNTER: ICD-10-CM

## 2020-06-30 DIAGNOSIS — S02.19XA CLOSED FRACTURE OF FRONTAL SINUS, INITIAL ENCOUNTER: Primary | ICD-10-CM

## 2020-06-30 DIAGNOSIS — R51.9 NONINTRACTABLE EPISODIC HEADACHE, UNSPECIFIED HEADACHE TYPE: ICD-10-CM

## 2020-06-30 DIAGNOSIS — J34.2 NASAL SEPTAL DEVIATION: ICD-10-CM

## 2020-06-30 DIAGNOSIS — S06.0X1A CONCUSSION WITH LOSS OF CONSCIOUSNESS OF 30 MINUTES OR LESS, INITIAL ENCOUNTER: ICD-10-CM

## 2020-06-30 DIAGNOSIS — F07.81 POSTCONCUSSION SYNDROME: ICD-10-CM

## 2020-06-30 PROCEDURE — 99204 PR OFFICE/OUTPT VISIT, NEW, LEVL IV, 45-59 MIN: ICD-10-PCS | Mod: S$GLB,,, | Performed by: SPECIALIST

## 2020-06-30 PROCEDURE — 99204 OFFICE O/P NEW MOD 45 MIN: CPT | Mod: S$GLB,,, | Performed by: SPECIALIST
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
## 2020-08-18 ENCOUNTER — TELEPHONE (OUTPATIENT)
Dept: PHYSICAL MEDICINE AND REHAB | Facility: CLINIC | Age: 25
End: 2020-08-18

## 2020-08-18 DIAGNOSIS — R41.3 MEMORY LOSS: ICD-10-CM

## 2020-08-18 DIAGNOSIS — G44.309 HEADACHES DUE TO OLD HEAD INJURY: ICD-10-CM

## 2020-08-18 DIAGNOSIS — S09.90XS HEADACHES DUE TO OLD HEAD INJURY: ICD-10-CM

## 2020-08-18 DIAGNOSIS — F07.81 POSTCONCUSSION SYNDROME: Primary | ICD-10-CM

## 2020-08-18 NOTE — TELEPHONE ENCOUNTER
----- Message from Marquez Jeronimo MA sent at 8/18/2020  9:01 AM CDT -----  Regarding: Referral change  Good morning ladies,    Just wanted to inform you guys that Dr. Perez has changed the referral for the patient to Physical Medicine and Rehab.     Thanks,

## 2020-10-13 ENCOUNTER — OFFICE VISIT (OUTPATIENT)
Dept: PHYSICAL MEDICINE AND REHAB | Facility: CLINIC | Age: 25
End: 2020-10-13
Payer: MEDICAID

## 2020-10-13 VITALS
SYSTOLIC BLOOD PRESSURE: 99 MMHG | HEIGHT: 61 IN | BODY MASS INDEX: 18.88 KG/M2 | HEART RATE: 65 BPM | WEIGHT: 100 LBS | DIASTOLIC BLOOD PRESSURE: 67 MMHG

## 2020-10-13 DIAGNOSIS — G44.309 HEADACHES DUE TO OLD HEAD INJURY: ICD-10-CM

## 2020-10-13 DIAGNOSIS — S09.90XS HEADACHES DUE TO OLD HEAD INJURY: ICD-10-CM

## 2020-10-13 DIAGNOSIS — R41.3 MEMORY LOSS: ICD-10-CM

## 2020-10-13 DIAGNOSIS — F07.81 POSTCONCUSSION SYNDROME: ICD-10-CM

## 2020-10-13 DIAGNOSIS — F32.A DEPRESSION, UNSPECIFIED DEPRESSION TYPE: Primary | ICD-10-CM

## 2020-10-13 PROCEDURE — 99999 PR PBB SHADOW E&M-EST. PATIENT-LVL IV: CPT | Mod: PBBFAC,,, | Performed by: PHYSICAL MEDICINE & REHABILITATION

## 2020-10-13 PROCEDURE — 99214 OFFICE O/P EST MOD 30 MIN: CPT | Mod: PBBFAC | Performed by: PHYSICAL MEDICINE & REHABILITATION

## 2020-10-13 PROCEDURE — 99205 PR OFFICE/OUTPT VISIT, NEW, LEVL V, 60-74 MIN: ICD-10-PCS | Mod: S$PBB,,, | Performed by: PHYSICAL MEDICINE & REHABILITATION

## 2020-10-13 PROCEDURE — 99205 OFFICE O/P NEW HI 60 MIN: CPT | Mod: S$PBB,,, | Performed by: PHYSICAL MEDICINE & REHABILITATION

## 2020-10-13 PROCEDURE — 99999 PR PBB SHADOW E&M-EST. PATIENT-LVL IV: ICD-10-PCS | Mod: PBBFAC,,, | Performed by: PHYSICAL MEDICINE & REHABILITATION

## 2020-10-13 RX ORDER — TRAZODONE HYDROCHLORIDE 50 MG/1
50 TABLET ORAL NIGHTLY
Qty: 45 TABLET | Refills: 0 | Status: SHIPPED | OUTPATIENT
Start: 2020-10-13 | End: 2022-10-06
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
## 2020-10-13 NOTE — LETTER
October 13, 2020      DONALD Perez MD  [address]  [address]  [facility] [address]           [facility]  [address]  [facility] [address]  Phone: [phone]          Patient: Jeri Galloway   MR Number: [id number]   YOB: 1995   Date of Visit: 10/13/2020       Dear Dr. DONALD Perez:    Thank you for referring Jeri Galloway to me for evaluation. Attached you will find relevant portions of my assessment and plan of care.    If you have questions, please do not hesitate to call me. I look forward to following Jeir Galloway along with you.    Sincerely,    Antonio Lopes MD    Enclosure  CC:  No Recipients    If you would like to receive this communication electronically, please contact [email or web address] or [phone] to request more information on [employer] access.    For providers and/or their staff who would like to refer a patient to [facility], please contact us through our one-stop-shop provider referral line, [facility], at [phone].    If you feel you have received this communication in error or would no longer like to receive these types of communications, please e-mail [email or web address]

## 2020-10-15 NOTE — ED NOTES
Contacted [facility] to coordinate bed reservation for pt at [facility].   Spoke to patient. Appt scheduled for today at 2pm with Pennie Vazquez PA-C.

## 2021-10-24 ENCOUNTER — HOSPITAL ENCOUNTER (EMERGENCY)
Facility: HOSPITAL | Age: 26
Discharge: HOME | End: 2021-10-24
Attending: EMERGENCY MEDICINE
Payer: MEDICAID

## 2021-10-24 VITALS
BODY MASS INDEX: 17.56 KG/M2 | TEMPERATURE: 99 F | OXYGEN SATURATION: 100 % | WEIGHT: 93 LBS | HEIGHT: 61 IN | HEART RATE: 78 BPM | DIASTOLIC BLOOD PRESSURE: 76 MMHG | SYSTOLIC BLOOD PRESSURE: 113 MMHG | RESPIRATION RATE: 18 BRPM

## 2021-10-24 DIAGNOSIS — S02.621A CLOSED SUBCONDYLAR FRACTURE OF RIGHT SIDE OF MANDIBLE, INITIAL ENCOUNTER: Primary | ICD-10-CM

## 2021-10-24 LAB
B-HCG UR QL: NEGATIVE
CTP QC/QA: YES

## 2021-10-24 PROCEDURE — 99284 EMERGENCY DEPT VISIT MOD MDM: CPT | Mod: 25

## 2021-10-24 PROCEDURE — 81025 URINE PREGNANCY TEST: CPT | Performed by: EMERGENCY MEDICINE

## 2021-10-24 RX ORDER — HYDROCODONE BITARTRATE AND ACETAMINOPHEN 5; 325 MG/1; MG/1
1 TABLET ORAL EVERY 4 HOURS PRN
Qty: 8 TABLET | Refills: 0 | Status: SHIPPED | OUTPATIENT
Start: 2021-10-24 | End: 2022-10-06

## 2022-02-23 ENCOUNTER — TELEPHONE (OUTPATIENT)
Dept: PRIMARY CARE CLINIC | Facility: CLINIC | Age: 27
End: 2022-02-23
Payer: MEDICAID

## 2022-02-23 ENCOUNTER — PATIENT MESSAGE (OUTPATIENT)
Dept: PRIMARY CARE CLINIC | Facility: CLINIC | Age: 27
End: 2022-02-23
Payer: MEDICAID

## 2022-02-23 NOTE — TELEPHONE ENCOUNTER
----- Message from Josi Ellington sent at 2/23/2022 11:06 AM CST -----  Contact: pt  Pt stated she has BV and would like to get a Rx called into the [facility]'s in [place] on [address],.Epic would  not let me add that pharmacy into the patients chart.Please advise

## 2022-02-23 NOTE — TELEPHONE ENCOUNTER
Spoke with pt. States [facility] does not take [place] medicaid - instructed she can go to an Urgent care if need to be to be treated for BV I cannot send her medication for something we cannot work up to be treated over the phone

## 2022-10-03 ENCOUNTER — OFFICE VISIT (OUTPATIENT)
Dept: PRIMARY CARE CLINIC | Facility: CLINIC | Age: 27
End: 2022-10-03
Payer: MEDICAID

## 2022-10-03 DIAGNOSIS — R31.0 GROSS HEMATURIA: Primary | ICD-10-CM

## 2022-10-03 PROCEDURE — 99213 PR OFFICE/OUTPT VISIT, EST, LEVL III, 20-29 MIN: ICD-10-PCS | Mod: 95,,, | Performed by: FAMILY MEDICINE

## 2022-10-03 PROCEDURE — 99213 OFFICE O/P EST LOW 20 MIN: CPT | Mod: 95,,, | Performed by: FAMILY MEDICINE

## 2022-10-03 NOTE — PROGRESS NOTES
Subjective:       Patient ID: Jeri Galloway is a 27 y.o. female.    Chief Complaint: Hematuria    28 yo F pt, new to me (following with Dr. Leach), with PMH significant for Bipolar 2 disorder with severe depression, h/o SI, h/o concussion with postconcussion syndrome/memory loss. She presents for evaluation via virtual visit with c/o abdominal pain and lower back pain x couple weeks. No dysuria, no malodorous urine. + urinary frequency. + urinary urgency. + hematuria. States there was painless passage of blood clots in her urine. + vaginal odor. No vaginal itching/discharge. States she was tested for HIV, RPR, HSV, GC/CT, Trich last Tuesday/Wednesday at an outside clinic; awaiting results.       Past Medical History:   Diagnosis Date    Depression     Migraine     complex    Psychiatric problem     Urinary tract infection        Patient Active Problem List   Diagnosis    Suicidal ideation    Severe depressed bipolar II disorder without psychotic features    Severe episode of recurrent major depressive disorder, without psychotic features    Bipolar II disorder, severe, depressed, with anxious distress, in partial remission    Closed fracture of frontal sinus    Nonintractable episodic headache    Contusion of temple region    Nasal septal deviation    Concussion wth loss of consciousness of 30 minutes or less    Postconcussion syndrome    Memory loss       Past Surgical History:   Procedure Laterality Date    none         Family History   Problem Relation Age of Onset    Hypertension Father     Hypertension Mother     Diabetes Maternal Grandmother     Cancer Paternal Grandmother        Social History     Tobacco Use   Smoking Status Never   Smokeless Tobacco Never       Medications have been reviewed and reconciled.     Review of patient's allergies indicates:   Allergen Reactions    Latex, natural rubber Hives        Review of Systems   Constitutional:  Negative for chills and fever.   Gastrointestinal:   Positive for abdominal pain. Negative for constipation, diarrhea, nausea and vomiting.   Genitourinary:  Positive for frequency, hematuria and urgency. Negative for vaginal discharge and vaginal pain.   Musculoskeletal:  Positive for back pain.       Objective:        Vitals Unable to be Obtained    Physical Exam  Constitutional:       General: She is not in acute distress.     Appearance: Normal appearance.   HENT:      Head: Normocephalic and atraumatic.      Right Ear: External ear normal.      Left Ear: External ear normal.   Eyes:      General: No scleral icterus.     Extraocular Movements: Extraocular movements intact.      Conjunctiva/sclera: Conjunctivae normal.   Pulmonary:      Effort: Pulmonary effort is normal. No respiratory distress.      Comments: Speaking in full sentences without difficulty. No audible wheezing.  Musculoskeletal:         General: Normal range of motion.      Cervical back: Normal range of motion and neck supple.   Skin:     General: Skin is warm and dry.   Neurological:      Mental Status: She is alert and oriented to person, place, and time.   Psychiatric:         Mood and Affect: Mood normal.         Behavior: Behavior normal.         Assessment:       1. Gross hematuria        Plan:       Jeri was seen today for hematuria.    Diagnoses and all orders for this visit:    Gross hematuria  -     Cancel: Urinalysis  -     Cancel: Urine culture  -     Urinalysis; Future  -     Urine culture; Future      26 yo F pt with c/o low back pain, lower abdominal pain, and LUTS x couple weeks. Recently with painless hematuria and blood clots in urine. Recommend UA, Ucx and in office exam. Pt will be seen on 10/05/2022 in am.

## 2022-10-04 ENCOUNTER — LAB VISIT (OUTPATIENT)
Dept: LAB | Facility: OTHER | Age: 27
End: 2022-10-04
Attending: FAMILY MEDICINE

## 2022-10-04 DIAGNOSIS — R31.0 GROSS HEMATURIA: ICD-10-CM

## 2022-10-04 LAB
BACTERIA #/AREA URNS HPF: ABNORMAL /HPF
BILIRUB UR QL STRIP: ABNORMAL
CLARITY UR: CLEAR
COLOR UR: YELLOW
GLUCOSE UR QL STRIP: NEGATIVE
HGB UR QL STRIP: NEGATIVE
HYALINE CASTS #/AREA URNS LPF: 0 /LPF
KETONES UR QL STRIP: ABNORMAL
LEUKOCYTE ESTERASE UR QL STRIP: NEGATIVE
MICROSCOPIC COMMENT: ABNORMAL
NITRITE UR QL STRIP: NEGATIVE
PH UR STRIP: 6 [PH] (ref 5–8)
PROT UR QL STRIP: ABNORMAL
RBC #/AREA URNS HPF: 5 /HPF (ref 0–4)
SP GR UR STRIP: >=1.03 (ref 1–1.03)
URN SPEC COLLECT METH UR: ABNORMAL
UROBILINOGEN UR STRIP-ACNC: 1 EU/DL
WBC #/AREA URNS HPF: 6 /HPF (ref 0–5)

## 2022-10-04 PROCEDURE — 81000 URINALYSIS NONAUTO W/SCOPE: CPT | Performed by: FAMILY MEDICINE

## 2022-10-04 PROCEDURE — 87086 URINE CULTURE/COLONY COUNT: CPT | Performed by: FAMILY MEDICINE

## 2022-10-05 ENCOUNTER — OFFICE VISIT (OUTPATIENT)
Dept: PRIMARY CARE CLINIC | Facility: CLINIC | Age: 27
End: 2022-10-05

## 2022-10-05 ENCOUNTER — PATIENT MESSAGE (OUTPATIENT)
Dept: PRIMARY CARE CLINIC | Facility: CLINIC | Age: 27
End: 2022-10-05

## 2022-10-05 VITALS
BODY MASS INDEX: 18.31 KG/M2 | SYSTOLIC BLOOD PRESSURE: 106 MMHG | HEIGHT: 61 IN | HEART RATE: 68 BPM | WEIGHT: 97 LBS | DIASTOLIC BLOOD PRESSURE: 63 MMHG | OXYGEN SATURATION: 97 %

## 2022-10-05 DIAGNOSIS — R10.30 LOWER ABDOMINAL PAIN: Primary | ICD-10-CM

## 2022-10-05 DIAGNOSIS — R31.0 GROSS HEMATURIA: ICD-10-CM

## 2022-10-05 LAB — BACTERIA UR CULT: NORMAL

## 2022-10-05 PROCEDURE — 99213 PR OFFICE/OUTPT VISIT, EST, LEVL III, 20-29 MIN: ICD-10-PCS | Mod: S$PBB,,, | Performed by: FAMILY MEDICINE

## 2022-10-05 PROCEDURE — 99999 PR PBB SHADOW E&M-EST. PATIENT-LVL IV: CPT | Mod: PBBFAC,,, | Performed by: FAMILY MEDICINE

## 2022-10-05 PROCEDURE — 99999 PR PBB SHADOW E&M-EST. PATIENT-LVL IV: ICD-10-PCS | Mod: PBBFAC,,, | Performed by: FAMILY MEDICINE

## 2022-10-05 PROCEDURE — 99214 OFFICE O/P EST MOD 30 MIN: CPT | Mod: PBBFAC,PN | Performed by: FAMILY MEDICINE

## 2022-10-05 PROCEDURE — 99213 OFFICE O/P EST LOW 20 MIN: CPT | Mod: S$PBB,,, | Performed by: FAMILY MEDICINE

## 2022-10-05 NOTE — PROGRESS NOTES
"Subjective:       Patient ID: Jeri Galloway is a 27 y.o. female.    Chief Complaint: Abdominal Pain    26 yo F pt, known to me (last visit 10/3/2022), with PMH significant for Bipolar 2 disorder with severe depression, h/o SI, h/o concussion with postconcussion syndrome/memory loss. She presents for in office evaluation of abdominal pain and lower back pain x couple weeks. Pertinent HPI from previous visit reviewed:   ======  No dysuria, no malodorous urine. + urinary frequency. + urinary urgency. + hematuria. States there was painless passage of blood clots in her urine. + vaginal odor. No vaginal itching/discharge. States she was tested for HIV, RPR, HSV, GC/CT, Trich last Tuesday/Wednesday at an outside clinic; awaiting results.   ===  Today, pt states that previously reported symptoms have resolved. The results for STI testing at outside clinic were all negative per pt. Reports she has no vaginal discharge or vaginal discomfort; does not feel that she has BV or yeast as she's had these infections in the past. LMP 09/16/22; comes once per month. On clarification of history she had painless "blood clots" in the urine 09/26-09/28/22. No blood in urine since 09/28/22. Microscopic UA 10/4/22 showed 5 RBCs, 6 WBCs. Urine Cx from 10/4/22 negative.  She also reports hair loss and night sweats. Pt recalls that she has had recurrent episodes of painless hematuria in the past that self-resolve.       Past Medical History:   Diagnosis Date    Depression     Migraine     complex    Psychiatric problem     Urinary tract infection        Patient Active Problem List   Diagnosis    Suicidal ideation    Severe depressed bipolar II disorder without psychotic features    Severe episode of recurrent major depressive disorder, without psychotic features    Bipolar II disorder, severe, depressed, with anxious distress, in partial remission    Closed fracture of frontal sinus    Nonintractable episodic headache    Contusion of " "temple region    Nasal septal deviation    Concussion wth loss of consciousness of 30 minutes or less    Postconcussion syndrome    Memory loss       Past Surgical History:   Procedure Laterality Date    none         Family History   Problem Relation Age of Onset    Hypertension Father     Hypertension Mother     Diabetes Maternal Grandmother     Cancer Paternal Grandmother        Social History     Tobacco Use   Smoking Status Never   Smokeless Tobacco Never       Medications have been reviewed and reconciled.     Review of patient's allergies indicates:   Allergen Reactions    Latex, natural rubber Hives        Review of Systems   Constitutional:  Positive for diaphoresis (nigth sweats). Negative for activity change, appetite change, chills, fever and unexpected weight change.   HENT:  Negative for congestion and sore throat.    Eyes:  Negative for redness, itching and visual disturbance.   Respiratory:  Negative for cough, chest tightness, shortness of breath and wheezing.    Cardiovascular:  Negative for chest pain, palpitations and leg swelling.   Gastrointestinal:  Negative for abdominal pain, constipation, diarrhea, nausea and vomiting.   Genitourinary:  Negative for dysuria, frequency, hematuria, urgency, vaginal bleeding and vaginal discharge.   Skin:  Negative for rash.   Neurological:  Negative for dizziness, syncope and headaches.   Psychiatric/Behavioral:  Negative for dysphoric mood and suicidal ideas. The patient is not nervous/anxious.        Objective:        /63 (BP Location: Left arm, Patient Position: Sitting, BP Method: Small (Automatic))   Pulse 68   Ht 5' 1" (1.549 m)   Wt 44 kg (97 lb)   LMP 09/16/2022   SpO2 97%   BMI 18.33 kg/m²      Physical Exam  Constitutional:       General: She is not in acute distress.     Appearance: She is well-developed.   HENT:      Head: Normocephalic and atraumatic.      Right Ear: External ear normal.      Left Ear: External ear normal.   Eyes:      " General: No scleral icterus.     Extraocular Movements: Extraocular movements intact.      Conjunctiva/sclera: Conjunctivae normal.   Cardiovascular:      Rate and Rhythm: Normal rate and regular rhythm.      Heart sounds: Normal heart sounds. No murmur heard.    No friction rub. No gallop.   Pulmonary:      Effort: Pulmonary effort is normal.      Breath sounds: Normal breath sounds. No wheezing or rales.   Abdominal:      General: Abdomen is flat. There is no distension.      Palpations: Abdomen is soft. There is no mass.      Tenderness: There is no abdominal tenderness. There is no right CVA tenderness or left CVA tenderness.   Musculoskeletal:         General: Normal range of motion.      Cervical back: Normal range of motion and neck supple.      Right lower leg: No edema.      Left lower leg: No edema.   Lymphadenopathy:      Cervical: No cervical adenopathy.   Skin:     General: Skin is warm and dry.      Findings: No erythema or rash.   Neurological:      Mental Status: She is alert and oriented to person, place, and time.      Cranial Nerves: No cranial nerve deficit.   Psychiatric:         Mood and Affect: Mood normal.         Behavior: Behavior normal.         Assessment:       1. Lower abdominal pain    2. Gross hematuria        Plan:       Jeri was seen today for abdominal pain.    Diagnoses and all orders for this visit:    Lower abdominal pain    Gross hematuria      Abdominal Pain, Back Pain, Hematuria  -Symptoms had been present x 2 weeks and now self-resolved  --Pt reports STI testing at outside off was negative  --UA showed minimally elevated RBC at 5 and WBC at 6. Ucx neg  --Reports h/o recurrent painless blood clots in urine. ?benign familial hematuria/thin basement membrane nephropathy  --No further work-up at this time.Consider referral to nephrology if symptoms recur.     Follow up in about 3 weeks (around 10/26/2022) for 40 min visit. Establish care, Hair Loss, Night Sweats. .

## 2022-10-18 ENCOUNTER — PATIENT MESSAGE (OUTPATIENT)
Dept: PRIMARY CARE CLINIC | Facility: CLINIC | Age: 27
End: 2022-10-18

## 2022-10-19 ENCOUNTER — PATIENT MESSAGE (OUTPATIENT)
Dept: PRIMARY CARE CLINIC | Facility: CLINIC | Age: 27
End: 2022-10-19

## 2023-06-20 ENCOUNTER — PATIENT MESSAGE (OUTPATIENT)
Dept: RESEARCH | Facility: HOSPITAL | Age: 28
End: 2023-06-20

## 2025-01-13 ENCOUNTER — HOSPITAL ENCOUNTER (EMERGENCY)
Facility: HOSPITAL | Age: 30
Discharge: HOME | End: 2025-01-13
Attending: EMERGENCY MEDICINE

## 2025-01-13 VITALS
RESPIRATION RATE: 18 BRPM | OXYGEN SATURATION: 100 % | SYSTOLIC BLOOD PRESSURE: 114 MMHG | DIASTOLIC BLOOD PRESSURE: 59 MMHG | HEIGHT: 61 IN | HEART RATE: 63 BPM | BODY MASS INDEX: 18.31 KG/M2 | TEMPERATURE: 99 F | WEIGHT: 97 LBS

## 2025-01-13 DIAGNOSIS — N76.0 BACTERIAL VAGINOSIS: ICD-10-CM

## 2025-01-13 DIAGNOSIS — R10.2 PELVIC PAIN: ICD-10-CM

## 2025-01-13 DIAGNOSIS — N94.6 MENSTRUAL CRAMP: Primary | ICD-10-CM

## 2025-01-13 DIAGNOSIS — B96.89 BACTERIAL VAGINOSIS: ICD-10-CM

## 2025-01-13 LAB
ALBUMIN SERPL BCP-MCNC: 3.7 G/DL (ref 3.5–5.2)
ALP SERPL-CCNC: 50 U/L (ref 40–150)
ALT SERPL W/O P-5'-P-CCNC: 11 U/L (ref 10–44)
ANION GAP SERPL CALC-SCNC: 6 MMOL/L (ref 8–16)
AST SERPL-CCNC: 14 U/L (ref 10–40)
B-HCG UR QL: NEGATIVE
BACTERIA #/AREA URNS AUTO: ABNORMAL /HPF
BACTERIA GENITAL QL WET PREP: ABNORMAL
BASOPHILS # BLD AUTO: 0.05 K/UL (ref 0–0.2)
BASOPHILS NFR BLD: 1 % (ref 0–1.9)
BILIRUB SERPL-MCNC: 0.4 MG/DL (ref 0.1–1)
BILIRUB UR QL STRIP: NEGATIVE
BUN SERPL-MCNC: 5 MG/DL (ref 6–20)
CALCIUM SERPL-MCNC: 8.8 MG/DL (ref 8.7–10.5)
CHLORIDE SERPL-SCNC: 107 MMOL/L (ref 95–110)
CLARITY UR REFRACT.AUTO: CLEAR
CLUE CELLS VAG QL WET PREP: ABNORMAL
CO2 SERPL-SCNC: 24 MMOL/L (ref 23–29)
COLOR UR AUTO: YELLOW
CREAT SERPL-MCNC: 0.7 MG/DL (ref 0.5–1.4)
CTP QC/QA: YES
DIFFERENTIAL METHOD BLD: ABNORMAL
EOSINOPHIL # BLD AUTO: 0.3 K/UL (ref 0–0.5)
EOSINOPHIL NFR BLD: 5.2 % (ref 0–8)
ERYTHROCYTE [DISTWIDTH] IN BLOOD BY AUTOMATED COUNT: 11.6 % (ref 11.5–14.5)
EST. GFR  (NO RACE VARIABLE): >60 ML/MIN/1.73 M^2
FILAMENT FUNGI VAG WET PREP-#/AREA: ABNORMAL
GLUCOSE SERPL-MCNC: 79 MG/DL (ref 70–110)
GLUCOSE UR QL STRIP: NEGATIVE
HCT VFR BLD AUTO: 36.8 % (ref 37–48.5)
HCV AB SERPL QL IA: NORMAL
HGB BLD-MCNC: 13 G/DL (ref 12–16)
HGB UR QL STRIP: ABNORMAL
HIV 1+2 AB+HIV1 P24 AG SERPL QL IA: NORMAL
IMM GRANULOCYTES # BLD AUTO: 0.01 K/UL (ref 0–0.04)
IMM GRANULOCYTES NFR BLD AUTO: 0.2 % (ref 0–0.5)
KETONES UR QL STRIP: NEGATIVE
LEUKOCYTE ESTERASE UR QL STRIP: ABNORMAL
LYMPHOCYTES # BLD AUTO: 2.7 K/UL (ref 1–4.8)
LYMPHOCYTES NFR BLD: 51.4 % (ref 18–48)
MCH RBC QN AUTO: 31.9 PG (ref 27–31)
MCHC RBC AUTO-ENTMCNC: 35.3 G/DL (ref 32–36)
MCV RBC AUTO: 90 FL (ref 82–98)
MICROSCOPIC COMMENT: ABNORMAL
MONOCYTES # BLD AUTO: 0.5 K/UL (ref 0.3–1)
MONOCYTES NFR BLD: 8.8 % (ref 4–15)
NEUTROPHILS # BLD AUTO: 1.7 K/UL (ref 1.8–7.7)
NEUTROPHILS NFR BLD: 33.4 % (ref 38–73)
NITRITE UR QL STRIP: NEGATIVE
NRBC BLD-RTO: 0 /100 WBC
PH UR STRIP: 6 [PH] (ref 5–8)
PLATELET # BLD AUTO: 298 K/UL (ref 150–450)
PMV BLD AUTO: 10.2 FL (ref 9.2–12.9)
POTASSIUM SERPL-SCNC: 3.4 MMOL/L (ref 3.5–5.1)
PROT SERPL-MCNC: 7 G/DL (ref 6–8.4)
PROT UR QL STRIP: ABNORMAL
RBC # BLD AUTO: 4.08 M/UL (ref 4–5.4)
RBC #/AREA URNS AUTO: 6 /HPF (ref 0–4)
SODIUM SERPL-SCNC: 137 MMOL/L (ref 136–145)
SP GR UR STRIP: 1.02 (ref 1–1.03)
SPECIMEN SOURCE: ABNORMAL
SQUAMOUS #/AREA URNS AUTO: 5 /HPF
T VAGINALIS GENITAL QL WET PREP: ABNORMAL
URN SPEC COLLECT METH UR: ABNORMAL
WBC # BLD AUTO: 5.21 K/UL (ref 3.9–12.7)
WBC #/AREA URNS AUTO: 6 /HPF (ref 0–5)
WBC #/AREA VAG WET PREP: ABNORMAL
YEAST GENITAL QL WET PREP: ABNORMAL

## 2025-01-13 PROCEDURE — 87591 N.GONORRHOEAE DNA AMP PROB: CPT | Performed by: PHYSICIAN ASSISTANT

## 2025-01-13 PROCEDURE — 86803 HEPATITIS C AB TEST: CPT | Performed by: PHYSICIAN ASSISTANT

## 2025-01-13 PROCEDURE — 81025 URINE PREGNANCY TEST: CPT | Performed by: EMERGENCY MEDICINE

## 2025-01-13 PROCEDURE — 80053 COMPREHEN METABOLIC PANEL: CPT | Performed by: EMERGENCY MEDICINE

## 2025-01-13 PROCEDURE — 25000003 PHARM REV CODE 250: Performed by: EMERGENCY MEDICINE

## 2025-01-13 PROCEDURE — 81001 URINALYSIS AUTO W/SCOPE: CPT | Performed by: PHYSICIAN ASSISTANT

## 2025-01-13 PROCEDURE — 99284 EMERGENCY DEPT VISIT MOD MDM: CPT | Mod: 25

## 2025-01-13 PROCEDURE — 87210 SMEAR WET MOUNT SALINE/INK: CPT | Performed by: PHYSICIAN ASSISTANT

## 2025-01-13 PROCEDURE — 85025 COMPLETE CBC W/AUTO DIFF WBC: CPT | Performed by: PHYSICIAN ASSISTANT

## 2025-01-13 PROCEDURE — 87389 HIV-1 AG W/HIV-1&-2 AB AG IA: CPT | Performed by: PHYSICIAN ASSISTANT

## 2025-01-13 RX ORDER — METRONIDAZOLE 500 MG/1
500 TABLET ORAL EVERY 12 HOURS
Qty: 14 TABLET | Refills: 0 | Status: SHIPPED | OUTPATIENT
Start: 2025-01-13 | End: 2025-01-20

## 2025-01-13 RX ORDER — ACETAMINOPHEN 325 MG/1
975 TABLET ORAL
Status: COMPLETED | OUTPATIENT
Start: 2025-01-13 | End: 2025-01-13

## 2025-01-13 RX ORDER — NAPROXEN 500 MG/1
500 TABLET ORAL 2 TIMES DAILY WITH MEALS
Qty: 60 TABLET | Refills: 0 | Status: SHIPPED | OUTPATIENT
Start: 2025-01-13

## 2025-01-13 RX ORDER — METRONIDAZOLE 500 MG/1
500 TABLET ORAL EVERY 12 HOURS
Qty: 14 TABLET | Refills: 0 | Status: SHIPPED | OUTPATIENT
Start: 2025-01-13 | End: 2025-01-13

## 2025-01-13 RX ADMIN — ACETAMINOPHEN 975 MG: 325 TABLET ORAL at 02:01

## 2025-01-13 NOTE — DISCHARGE INSTRUCTIONS
Please follow up with OBGYN for pelvic pain    Naproxen prescribed today for pain.  Take as needed and directed.  Take with meals.  Do not take with other ibuprofen containing products.  Please add over-the-counter Tylenol for additional pain relief.  Take as directed on packaging.

## 2025-01-13 NOTE — ED PROVIDER NOTES
Encounter Date: 1/13/2025       History     Chief Complaint   Patient presents with    menstrual cramps     29-year-old female with a PMHx of migraines presents to ED with pelvic pain.  She describes excruciating menstrual cramps.  She is currently on her menstrual cycle.  Her pain is worse on the right side.  She has a history of ovarian cysts and is concerned that she may have another one.  She goes through approximately 4 menstrual cups a day.  Her menstrual cycle typically lasts for 14 days.  She typically has painful cramps.  She has nausea without vomiting.  She tried taking ibuprofen with minimal relief.  She had to miss work today because she could not get out of her bed secondary to pain.  She is sexually active. She is denies dysuria, vaginal discharge, fever, lightheadedness, dizziness.      The history is provided by the patient.     Review of patient's allergies indicates:   Allergen Reactions    Latex, natural rubber Hives     Past Medical History:   Diagnosis Date    Depression     Migraine     complex    Psychiatric problem     Urinary tract infection      Past Surgical History:   Procedure Laterality Date    none       Family History   Problem Relation Name Age of Onset    Hypertension Father      Hypertension Mother      Diabetes Maternal Grandmother      Cancer Paternal Grandmother       Social History     Tobacco Use    Smoking status: Never    Smokeless tobacco: Never   Substance Use Topics    Alcohol use: Yes     Comment: Occasionally    Drug use: No     Review of Systems   Constitutional:  Negative for fever.   Genitourinary:  Positive for menstrual problem and pelvic pain. Negative for dysuria, flank pain and vaginal discharge.   Neurological:  Negative for dizziness and light-headedness.       Physical Exam     Initial Vitals [01/13/25 1130]   BP Pulse Resp Temp SpO2   120/64 66 16 98.1 °F (36.7 °C) 100 %      MAP       --         Physical Exam    Nursing note and vitals  reviewed.  Constitutional: She appears well-developed and well-nourished. She is not diaphoretic. No distress.   HENT:   Head: Normocephalic and atraumatic.   Nose: Nose normal.   Eyes: Conjunctivae and EOM are normal.   Neck: Neck supple.   Cardiovascular:  Normal rate.           Pulmonary/Chest: No respiratory distress.   Abdominal:   Right lower pelvic tenderness There is no guarding and no tenderness at McBurney's point.   Genitourinary:    Genitourinary Comments: deferred     Musculoskeletal:      Cervical back: Neck supple.     Neurological: She is alert and oriented to person, place, and time. Gait normal.   Skin: No rash noted.   Psychiatric: She has a normal mood and affect. Thought content normal.         ED Course   Procedures  Labs Reviewed   CBC W/ AUTO DIFFERENTIAL - Abnormal       Result Value    WBC 5.21      RBC 4.08      Hemoglobin 13.0      Hematocrit 36.8 (*)     MCV 90      MCH 31.9 (*)     MCHC 35.3      RDW 11.6      Platelets 298      MPV 10.2      Immature Granulocytes 0.2      Gran # (ANC) 1.7 (*)     Immature Grans (Abs) 0.01      Lymph # 2.7      Mono # 0.5      Eos # 0.3      Baso # 0.05      nRBC 0      Gran % 33.4 (*)     Lymph % 51.4 (*)     Mono % 8.8      Eosinophil % 5.2      Basophil % 1.0      Differential Method Automated     URINALYSIS, REFLEX TO URINE CULTURE - Abnormal    Specimen UA Urine, Clean Catch      Color, UA Yellow      Appearance, UA Clear      pH, UA 6.0      Specific Gravity, UA 1.025      Protein, UA Trace (*)     Glucose, UA Negative      Ketones, UA Negative      Bilirubin (UA) Negative      Occult Blood UA 3+ (*)     Nitrite, UA Negative      Leukocytes, UA Trace (*)     Narrative:     Specimen Source->Urine   URINALYSIS MICROSCOPIC - Abnormal    RBC, UA 6 (*)     WBC, UA 6 (*)     Bacteria Occasional      Squam Epithel, UA 5      Microscopic Comment SEE COMMENT      Narrative:     Specimen Source->Urine   VAGINAL SCREEN   HEPATITIS C ANTIBODY    Hepatitis C  Ab Non-reactive      Narrative:     Release to patient->Immediate   HIV 1 / 2 ANTIBODY    HIV 1/2 Ag/Ab Non-reactive      Narrative:     Release to patient->Immediate   COMPREHENSIVE METABOLIC PANEL   C. TRACHOMATIS/N. GONORRHOEAE BY AMP DNA   POCT URINE PREGNANCY    POC Preg Test, Ur Negative       Acceptable Yes            Imaging Results    None          Medications   acetaminophen tablet 975 mg (975 mg Oral Given 1/13/25 1403)     Medical Decision Making  29-year-old female with a PMHx of migraines presents to ED with pelvic pain. Nontoxic appearing. Hemodynamically stable. Afebrile. Exam as above. I will initiate workup and reassess.    Ddx:  Dysmenorrhea, ruptured ovarian cyst, uterine fibroids, PID, TOA, torsion    Labs without leukocytosis. Hgb stable. UPT negative.   UA with trace leukocytes nitrites negative, 3+ blood.  She is on her menstrual cycle.  She denies urinary symptoms    I will sign patient out at this time due to shift change to MANNY Neely PA-C. Pending pelvic ultrasound at this time.     Amount and/or Complexity of Data Reviewed  Labs: ordered. Decision-making details documented in ED Course.  Radiology: ordered.               ED Course as of 01/13/25 1604   Mon Jan 13, 2025   1451 hCG Qualitative, Urine: Negative [HM]   1543 Hemoglobin: 13.0 [HM]   1544 Hematocrit(!): 36.8 [HM]   1544 Blood, UA(!): 3+  On menstrual cycle [HM]   1544 NITRITE UA: Negative [HM]   1544 Leukocyte Esterase, UA(!): Trace [HM]   1544 RBC, UA(!): 6 [HM]   1544 WBC, UA(!): 6 [HM]   1544 Bacteria, UA: Occasional [HM]   1544 Squam Epithel, UA: 5 [HM]      ED Course User Index  [HM] Camille Brandon PA-C                           Clinical Impression:  Final diagnoses:  [N94.6] Menstrual cramp (Primary)  [R10.2] Pelvic pain                 Camille Brandon PA-C  01/13/25 1604

## 2025-01-13 NOTE — ED TRIAGE NOTES
Patient presents to the ED via private transport with complaints of menstrual cramps that progressively got worse today. States she had a hx of cyst. Pt states she has had more bleeding than normal. Patient states her period started yesterday.

## 2025-01-13 NOTE — PLAN OF CARE
[facility] faxed referral to [facility] [phone]      Christiana Pal CD, MSW, LMSW, RSW   Case Management  [facility]  Email: [email or web address]

## 2025-01-13 NOTE — FIRST PROVIDER EVALUATION
Medical screening examination initiated.  I have conducted a focused provider triage encounter, findings are as follows:    Brief history of present illness:  30 y/o f, c/o severe menstrual cramps unrelieved by ibuprofen    There were no vitals filed for this visit.    Pertinent physical exam:  well-appearing clinically, normal VS    Brief workup plan:  UPT, tylenol    Preliminary workup initiated; this workup will be continued and followed by the physician or advanced practice provider that is assigned to the patient when roomed.

## 2025-01-13 NOTE — ED NOTES
Pt provided swabs and educated on self swabbing. Pt ambulatory to bathroom independently to collect specimens.

## 2025-01-14 LAB
C TRACH DNA SPEC QL NAA+PROBE: NOT DETECTED
N GONORRHOEA DNA SPEC QL NAA+PROBE: NOT DETECTED

## 2025-01-14 NOTE — ED PROVIDER NOTES
ED Physician Hand-off Note:    ED Course: I assumed care of patient from off-going ED physician team. Briefly, Patient is a 29-year-old female with pelvic pain.    At the time of signout plan was pending ultrasound.    Medications given in the ED:    Medications   acetaminophen tablet 975 mg (975 mg Oral Given 1/13/25 1403)       Disposition:  Reviewed ultrasound no abnormalities particularly nose evidence of torsion.  History consistent with menstrual cramping.  No vaginal screening with rare clue cells will treat for BV.  Labs are reassuring.  No leukocytosis repeat exam reassuring less likely appendicitis although counseled on strict return ED precautions if pain worsens.  Recommend follow-up with OBGYN outpatient for dysmenorrhea.      Patient comfortable with discharge. Patient counseled regarding exam, results, diagnosis, treatment, and plan.    Impression:  Menstrual cramps, BV       Francesca Neely PA-C  01/13/25 5584

## 2025-01-16 ENCOUNTER — NURSE TRIAGE (OUTPATIENT)
Dept: ADMINISTRATIVE | Facility: CLINIC | Age: 30
End: 2025-01-16

## 2025-01-16 NOTE — TELEPHONE ENCOUNTER
No contact made with caller x 2 attempts. No option to leave vm.   Reason for Disposition   Second attempt to contact caller AND no contact made. Phone number verified.    Protocols used: No Contact or Duplicate Contact Call-A-AH

## 2025-01-17 ENCOUNTER — NURSE TRIAGE (OUTPATIENT)
Dept: ADMINISTRATIVE | Facility: CLINIC | Age: 30
End: 2025-01-17

## 2025-01-17 NOTE — TELEPHONE ENCOUNTER
Caller states that she was seen on Monday and prescribed naproxen. Caller states she now has chest discomfort and nausea x 2 days. Caller states medication is causing her chest pressure and intermitted SOB. Pt states she has sweat dripping down face.  Pt advised 911 per protocol and verbalized understanding.     Reason for Disposition   Visible sweat on face or sweat dripping down face    Additional Information   Negative: [1] Chest pain lasts > 5 minutes AND [2] described as crushing, pressure-like, or heavy   Negative: [1] Chest pain lasts > 5 minutes AND [2] history of heart disease (i.e., angina, heart attack, heart failure, bypass surgery, takes nitroglycerin)   Negative: [1] Chest pain lasts > 5 minutes AND [2] age > 44   Negative: [1] Chest pain lasts > 5 minutes AND [2] age > 30 AND [3] one or more cardiac risk factors (e.g., diabetes, high blood pressure, high cholesterol, obesity with BMI 30 or higher, smoker, or strong family history of heart disease)   Negative: Passed out (e.g., fainted, lost consciousness, blacked out and was not responding)   Negative: Shock suspected (e.g., cold/pale/clammy skin, too weak to stand, low BP, rapid pulse)   Negative: SEVERE difficulty breathing (e.g., struggling for each breath, speaks in single words)   Negative: Difficult to awaken or acting confused (e.g., disoriented, slurred speech)   Negative: Heart beating < 50 beats per minute OR > 140 beats per minute   Negative: Sounds like a life-threatening emergency to the triager    Protocols used: Chest Pain-A-AH